# Patient Record
Sex: FEMALE | Race: WHITE
[De-identification: names, ages, dates, MRNs, and addresses within clinical notes are randomized per-mention and may not be internally consistent; named-entity substitution may affect disease eponyms.]

---

## 2019-01-07 PROBLEM — Z00.00 ENCOUNTER FOR PREVENTIVE HEALTH EXAMINATION: Status: ACTIVE | Noted: 2019-01-07

## 2019-01-08 ENCOUNTER — APPOINTMENT (OUTPATIENT)
Dept: DERMATOLOGY | Facility: CLINIC | Age: 84
End: 2019-01-08
Payer: MEDICARE

## 2019-01-08 VITALS — BODY MASS INDEX: 35.68 KG/M2 | WEIGHT: 170 LBS | HEIGHT: 58 IN

## 2019-01-08 DIAGNOSIS — Z91.89 OTHER SPECIFIED PERSONAL RISK FACTORS, NOT ELSEWHERE CLASSIFIED: ICD-10-CM

## 2019-01-08 DIAGNOSIS — Z86.79 PERSONAL HISTORY OF OTHER DISEASES OF THE CIRCULATORY SYSTEM: ICD-10-CM

## 2019-01-08 DIAGNOSIS — H91.93 UNSPECIFIED HEARING LOSS, BILATERAL: ICD-10-CM

## 2019-01-08 PROCEDURE — 99204 OFFICE O/P NEW MOD 45 MIN: CPT

## 2019-01-22 ENCOUNTER — APPOINTMENT (OUTPATIENT)
Dept: DERMATOLOGY | Facility: CLINIC | Age: 84
End: 2019-01-22
Payer: MEDICARE

## 2019-01-22 LAB
ALBUMIN SERPL ELPH-MCNC: 3.6 G/DL
ALP BLD-CCNC: 51 U/L
ALT SERPL-CCNC: 15 U/L
ANION GAP SERPL CALC-SCNC: 10 MMOL/L
AST SERPL-CCNC: 20 U/L
BASOPHILS # BLD AUTO: 0.04 K/UL
BASOPHILS NFR BLD AUTO: 0.3 %
BILIRUB SERPL-MCNC: 0.3 MG/DL
BUN SERPL-MCNC: 27 MG/DL
CALCIUM SERPL-MCNC: 9.1 MG/DL
CHLORIDE SERPL-SCNC: 101 MMOL/L
CO2 SERPL-SCNC: 30 MMOL/L
CREAT SERPL-MCNC: 1.11 MG/DL
EOSINOPHIL # BLD AUTO: 0.61 K/UL
EOSINOPHIL NFR BLD AUTO: 5 %
GLUCOSE SERPL-MCNC: 66 MG/DL
HBV CORE IGG+IGM SER QL: NONREACTIVE
HBV SURFACE AB SER QL: NONREACTIVE
HBV SURFACE AG SER QL: NONREACTIVE
HCT VFR BLD CALC: 40.7 %
HCV AB SER QL: NONREACTIVE
HCV S/CO RATIO: 0.22 S/CO
HGB BLD-MCNC: 12.6 G/DL
IMM GRANULOCYTES NFR BLD AUTO: 0.4 %
LYMPHOCYTES # BLD AUTO: 3.15 K/UL
LYMPHOCYTES NFR BLD AUTO: 25.6 %
M TB IFN-G BLD-IMP: NEGATIVE
MAN DIFF?: NORMAL
MCHC RBC-ENTMCNC: 29.6 PG
MCHC RBC-ENTMCNC: 31 GM/DL
MCV RBC AUTO: 95.5 FL
MONOCYTES # BLD AUTO: 1.47 K/UL
MONOCYTES NFR BLD AUTO: 11.9 %
NEUTROPHILS # BLD AUTO: 6.99 K/UL
NEUTROPHILS NFR BLD AUTO: 56.8 %
PLATELET # BLD AUTO: 305 K/UL
POTASSIUM SERPL-SCNC: 4.1 MMOL/L
PROT SERPL-MCNC: 6.8 G/DL
QUANTIFERON TB PLUS MITOGEN MINUS NIL: 7.1 IU/ML
QUANTIFERON TB PLUS NIL: 0.01 IU/ML
QUANTIFERON TB PLUS TB1 MINUS NIL: 0 IU/ML
QUANTIFERON TB PLUS TB2 MINUS NIL: 0 IU/ML
RBC # BLD: 4.26 M/UL
RBC # FLD: 15 %
SODIUM SERPL-SCNC: 141 MMOL/L
TPMT ENZYME INTERPRETATION: NORMAL
TPMT ENZYME METHODOLOGY: NORMAL
TPMT ENZYME: 17.9
WBC # FLD AUTO: 12.31 K/UL

## 2019-01-22 PROCEDURE — 99214 OFFICE O/P EST MOD 30 MIN: CPT

## 2019-01-22 RX ORDER — PREDNISONE 10 MG/1
10 TABLET ORAL DAILY
Qty: 90 | Refills: 0 | Status: ACTIVE | COMMUNITY
Start: 2019-01-08 | End: 1900-01-01

## 2019-02-06 ENCOUNTER — LABORATORY RESULT (OUTPATIENT)
Age: 84
End: 2019-02-06

## 2019-02-12 ENCOUNTER — APPOINTMENT (OUTPATIENT)
Dept: DERMATOLOGY | Facility: CLINIC | Age: 84
End: 2019-02-12
Payer: MEDICARE

## 2019-02-12 ENCOUNTER — LABORATORY RESULT (OUTPATIENT)
Age: 84
End: 2019-02-12

## 2019-02-12 PROCEDURE — 99214 OFFICE O/P EST MOD 30 MIN: CPT

## 2019-02-12 RX ORDER — DOXAZOSIN 1 MG/1
1 TABLET ORAL
Refills: 0 | Status: ACTIVE | COMMUNITY

## 2019-02-12 RX ORDER — PANTOPRAZOLE 40 MG/1
40 TABLET, DELAYED RELEASE ORAL
Refills: 0 | Status: ACTIVE | COMMUNITY

## 2019-02-12 RX ORDER — TRIAMTERENE AND HYDROCHLOROTHIAZIDE 37.5; 25 MG/1; MG/1
37.5-25 CAPSULE ORAL
Refills: 0 | Status: ACTIVE | COMMUNITY

## 2019-02-12 RX ORDER — ESCITALOPRAM OXALATE 10 MG/1
10 TABLET, FILM COATED ORAL
Refills: 0 | Status: ACTIVE | COMMUNITY

## 2019-02-12 RX ORDER — AMLODIPINE BESYLATE 5 MG/1
5 TABLET ORAL
Refills: 0 | Status: ACTIVE | COMMUNITY

## 2019-02-12 RX ORDER — AZATHIOPRINE 50 1/1
50 TABLET ORAL
Qty: 60 | Refills: 0 | Status: ACTIVE | COMMUNITY
Start: 2019-01-22 | End: 1900-01-01

## 2019-02-12 RX ORDER — METOPROLOL TARTRATE 50 MG/1
50 TABLET ORAL
Refills: 0 | Status: ACTIVE | COMMUNITY

## 2019-02-13 ENCOUNTER — RX CHANGE (OUTPATIENT)
Age: 84
End: 2019-02-13

## 2019-02-13 LAB
ALBUMIN SERPL ELPH-MCNC: 3.4 G/DL
ALP BLD-CCNC: 36 U/L
ALT SERPL-CCNC: 31 U/L
ANION GAP SERPL CALC-SCNC: 13 MMOL/L
AST SERPL-CCNC: 22 U/L
BASOPHILS # BLD AUTO: 0 K/UL
BASOPHILS NFR BLD AUTO: 0 %
BILIRUB SERPL-MCNC: 0.3 MG/DL
BUN SERPL-MCNC: 37 MG/DL
CALCIUM SERPL-MCNC: 9 MG/DL
CHLORIDE SERPL-SCNC: 99 MMOL/L
CO2 SERPL-SCNC: 29 MMOL/L
CREAT SERPL-MCNC: 1.23 MG/DL
DESMOGLEIN 1 AB SER-ACNC: >100 U
DESMOGLEIN 3 AB SER-ACNC: >100 U
EOSINOPHIL # BLD AUTO: 0.2 K/UL
EOSINOPHIL NFR BLD AUTO: 2 %
GLUCOSE SERPL-MCNC: 128 MG/DL
HCT VFR BLD CALC: 38.4 %
HGB BLD-MCNC: 11.9 G/DL
LYMPHOCYTES # BLD AUTO: 1.23 K/UL
LYMPHOCYTES NFR BLD AUTO: 12 %
MAN DIFF?: NORMAL
MCHC RBC-ENTMCNC: 29.7 PG
MCHC RBC-ENTMCNC: 31 GM/DL
MCV RBC AUTO: 95.8 FL
MONOCYTES # BLD AUTO: 0.41 K/UL
MONOCYTES NFR BLD AUTO: 4 %
NEUTROPHILS # BLD AUTO: 7.97 K/UL
NEUTROPHILS NFR BLD AUTO: 78 %
PLATELET # BLD AUTO: 281 K/UL
POTASSIUM SERPL-SCNC: 4.4 MMOL/L
PROT SERPL-MCNC: 5.9 G/DL
RBC # BLD: 4.01 M/UL
RBC # FLD: 15.8 %
SODIUM SERPL-SCNC: 141 MMOL/L
WBC # FLD AUTO: 10.22 K/UL

## 2019-02-14 ENCOUNTER — INPATIENT (INPATIENT)
Facility: HOSPITAL | Age: 84
LOS: 2 days | Discharge: HOME HEALTH | End: 2019-02-17
Attending: HOSPITALIST | Admitting: HOSPITALIST
Payer: MEDICARE

## 2019-02-14 ENCOUNTER — RX CHANGE (OUTPATIENT)
Age: 84
End: 2019-02-14

## 2019-02-14 VITALS — SYSTOLIC BLOOD PRESSURE: 119 MMHG | TEMPERATURE: 98 F | HEART RATE: 80 BPM | DIASTOLIC BLOOD PRESSURE: 40 MMHG

## 2019-02-14 DIAGNOSIS — Z29.9 ENCOUNTER FOR PROPHYLACTIC MEASURES, UNSPECIFIED: ICD-10-CM

## 2019-02-14 DIAGNOSIS — E03.9 HYPOTHYROIDISM, UNSPECIFIED: ICD-10-CM

## 2019-02-14 DIAGNOSIS — I10 ESSENTIAL (PRIMARY) HYPERTENSION: ICD-10-CM

## 2019-02-14 DIAGNOSIS — E78.5 HYPERLIPIDEMIA, UNSPECIFIED: ICD-10-CM

## 2019-02-14 DIAGNOSIS — L10.0 PEMPHIGUS VULGARIS: ICD-10-CM

## 2019-02-14 LAB
ALBUMIN SERPL ELPH-MCNC: 3.1 G/DL — LOW (ref 3.3–5)
ALP SERPL-CCNC: 35 U/L — LOW (ref 40–120)
ALT FLD-CCNC: 27 U/L — SIGNIFICANT CHANGE UP (ref 4–33)
ANION GAP SERPL CALC-SCNC: 11 MMO/L — SIGNIFICANT CHANGE UP (ref 7–14)
AST SERPL-CCNC: 18 U/L — SIGNIFICANT CHANGE UP (ref 4–32)
BASOPHILS # BLD AUTO: 0.02 K/UL — SIGNIFICANT CHANGE UP (ref 0–0.2)
BASOPHILS NFR BLD AUTO: 0.3 % — SIGNIFICANT CHANGE UP (ref 0–2)
BILIRUB SERPL-MCNC: < 0.2 MG/DL — LOW (ref 0.2–1.2)
BUN SERPL-MCNC: 36 MG/DL — HIGH (ref 7–23)
CALCIUM SERPL-MCNC: 9 MG/DL — SIGNIFICANT CHANGE UP (ref 8.4–10.5)
CHLORIDE SERPL-SCNC: 101 MMOL/L — SIGNIFICANT CHANGE UP (ref 98–107)
CO2 SERPL-SCNC: 29 MMOL/L — SIGNIFICANT CHANGE UP (ref 22–31)
CREAT SERPL-MCNC: 1.24 MG/DL — SIGNIFICANT CHANGE UP (ref 0.5–1.3)
EOSINOPHIL # BLD AUTO: 0 K/UL — SIGNIFICANT CHANGE UP (ref 0–0.5)
EOSINOPHIL NFR BLD AUTO: 0 % — SIGNIFICANT CHANGE UP (ref 0–6)
GLUCOSE SERPL-MCNC: 120 MG/DL — HIGH (ref 70–99)
HCT VFR BLD CALC: 36.1 % — SIGNIFICANT CHANGE UP (ref 34.5–45)
HGB BLD-MCNC: 11.4 G/DL — LOW (ref 11.5–15.5)
IMM GRANULOCYTES NFR BLD AUTO: 2.4 % — HIGH (ref 0–1.5)
LYMPHOCYTES # BLD AUTO: 1.11 K/UL — SIGNIFICANT CHANGE UP (ref 1–3.3)
LYMPHOCYTES # BLD AUTO: 18.8 % — SIGNIFICANT CHANGE UP (ref 13–44)
MCHC RBC-ENTMCNC: 30.4 PG — SIGNIFICANT CHANGE UP (ref 27–34)
MCHC RBC-ENTMCNC: 31.6 % — LOW (ref 32–36)
MCV RBC AUTO: 96.3 FL — SIGNIFICANT CHANGE UP (ref 80–100)
MONOCYTES # BLD AUTO: 0.38 K/UL — SIGNIFICANT CHANGE UP (ref 0–0.9)
MONOCYTES NFR BLD AUTO: 6.4 % — SIGNIFICANT CHANGE UP (ref 2–14)
NEUTROPHILS # BLD AUTO: 4.25 K/UL — SIGNIFICANT CHANGE UP (ref 1.8–7.4)
NEUTROPHILS NFR BLD AUTO: 72.1 % — SIGNIFICANT CHANGE UP (ref 43–77)
NRBC # FLD: 0 K/UL — LOW (ref 25–125)
PLATELET # BLD AUTO: 252 K/UL — SIGNIFICANT CHANGE UP (ref 150–400)
PMV BLD: 9.2 FL — SIGNIFICANT CHANGE UP (ref 7–13)
POTASSIUM SERPL-MCNC: 5.4 MMOL/L — HIGH (ref 3.5–5.3)
POTASSIUM SERPL-SCNC: 5.4 MMOL/L — HIGH (ref 3.5–5.3)
PROT SERPL-MCNC: 6.1 G/DL — SIGNIFICANT CHANGE UP (ref 6–8.3)
RBC # BLD: 3.75 M/UL — LOW (ref 3.8–5.2)
RBC # FLD: 15.1 % — HIGH (ref 10.3–14.5)
SODIUM SERPL-SCNC: 141 MMOL/L — SIGNIFICANT CHANGE UP (ref 135–145)
WBC # BLD: 5.9 K/UL — SIGNIFICANT CHANGE UP (ref 3.8–10.5)
WBC # FLD AUTO: 5.9 K/UL — SIGNIFICANT CHANGE UP (ref 3.8–10.5)

## 2019-02-14 PROCEDURE — 99223 1ST HOSP IP/OBS HIGH 75: CPT | Mod: GC

## 2019-02-14 RX ORDER — DIPHENHYDRAMINE HCL 50 MG
50 CAPSULE ORAL EVERY 6 HOURS
Refills: 0 | Status: DISCONTINUED | OUTPATIENT
Start: 2019-02-14 | End: 2019-02-17

## 2019-02-14 RX ORDER — BETAMETHASONE DIPROPIONATE 0.5 MG/G
0.05 OINTMENT TOPICAL
Qty: 1 | Refills: 0 | Status: ACTIVE | OUTPATIENT
Start: 2019-02-12

## 2019-02-14 RX ORDER — ATORVASTATIN CALCIUM 80 MG/1
20 TABLET, FILM COATED ORAL AT BEDTIME
Refills: 0 | Status: DISCONTINUED | OUTPATIENT
Start: 2019-02-14 | End: 2019-02-17

## 2019-02-14 RX ORDER — DIPHENHYDRAMINE HCL 50 MG
50 CAPSULE ORAL ONCE
Refills: 0 | Status: COMPLETED | OUTPATIENT
Start: 2019-02-14 | End: 2019-02-14

## 2019-02-14 RX ORDER — AMLODIPINE BESYLATE 2.5 MG/1
5 TABLET ORAL DAILY
Refills: 0 | Status: DISCONTINUED | OUTPATIENT
Start: 2019-02-14 | End: 2019-02-17

## 2019-02-14 RX ORDER — METOPROLOL TARTRATE 50 MG
50 TABLET ORAL
Refills: 0 | Status: DISCONTINUED | OUTPATIENT
Start: 2019-02-14 | End: 2019-02-17

## 2019-02-14 RX ORDER — ESCITALOPRAM OXALATE 10 MG/1
10 TABLET, FILM COATED ORAL DAILY
Refills: 0 | Status: DISCONTINUED | OUTPATIENT
Start: 2019-02-14 | End: 2019-02-17

## 2019-02-14 RX ORDER — AZATHIOPRINE 100 MG/1
100 TABLET ORAL DAILY
Refills: 0 | Status: DISCONTINUED | OUTPATIENT
Start: 2019-02-14 | End: 2019-02-15

## 2019-02-14 RX ORDER — MORPHINE SULFATE 50 MG/1
4 CAPSULE, EXTENDED RELEASE ORAL ONCE
Refills: 0 | Status: DISCONTINUED | OUTPATIENT
Start: 2019-02-14 | End: 2019-02-14

## 2019-02-14 RX ORDER — MUPIROCIN 20 MG/G
1 OINTMENT TOPICAL
Refills: 0 | Status: DISCONTINUED | OUTPATIENT
Start: 2019-02-14 | End: 2019-02-17

## 2019-02-14 RX ORDER — PANTOPRAZOLE SODIUM 20 MG/1
40 TABLET, DELAYED RELEASE ORAL
Refills: 0 | Status: DISCONTINUED | OUTPATIENT
Start: 2019-02-14 | End: 2019-02-17

## 2019-02-14 RX ORDER — HEPARIN SODIUM 5000 [USP'U]/ML
5000 INJECTION INTRAVENOUS; SUBCUTANEOUS EVERY 8 HOURS
Refills: 0 | Status: DISCONTINUED | OUTPATIENT
Start: 2019-02-14 | End: 2019-02-17

## 2019-02-14 RX ORDER — ASPIRIN/CALCIUM CARB/MAGNESIUM 324 MG
81 TABLET ORAL DAILY
Refills: 0 | Status: DISCONTINUED | OUTPATIENT
Start: 2019-02-14 | End: 2019-02-17

## 2019-02-14 RX ORDER — DOXAZOSIN MESYLATE 4 MG
1 TABLET ORAL DAILY
Refills: 0 | Status: DISCONTINUED | OUTPATIENT
Start: 2019-02-14 | End: 2019-02-14

## 2019-02-14 RX ORDER — LEVOTHYROXINE SODIUM 125 MCG
25 TABLET ORAL DAILY
Refills: 0 | Status: DISCONTINUED | OUTPATIENT
Start: 2019-02-14 | End: 2019-02-17

## 2019-02-14 RX ORDER — OMEGA-3 ACID ETHYL ESTERS 1 G
2 CAPSULE ORAL
Refills: 0 | Status: DISCONTINUED | OUTPATIENT
Start: 2019-02-14 | End: 2019-02-17

## 2019-02-14 RX ORDER — SODIUM CHLORIDE 9 MG/ML
1000 INJECTION, SOLUTION INTRAVENOUS
Refills: 0 | Status: DISCONTINUED | OUTPATIENT
Start: 2019-02-14 | End: 2019-02-17

## 2019-02-14 RX ORDER — ACETAMINOPHEN 500 MG
650 TABLET ORAL EVERY 6 HOURS
Refills: 0 | Status: DISCONTINUED | OUTPATIENT
Start: 2019-02-14 | End: 2019-02-17

## 2019-02-14 RX ORDER — DOXAZOSIN MESYLATE 4 MG
1 TABLET ORAL DAILY
Refills: 0 | Status: DISCONTINUED | OUTPATIENT
Start: 2019-02-14 | End: 2019-02-17

## 2019-02-14 RX ORDER — OXYCODONE AND ACETAMINOPHEN 5; 325 MG/1; MG/1
1 TABLET ORAL EVERY 6 HOURS
Refills: 0 | Status: DISCONTINUED | OUTPATIENT
Start: 2019-02-14 | End: 2019-02-17

## 2019-02-14 RX ADMIN — ATORVASTATIN CALCIUM 20 MILLIGRAM(S): 80 TABLET, FILM COATED ORAL at 21:51

## 2019-02-14 RX ADMIN — MORPHINE SULFATE 4 MILLIGRAM(S): 50 CAPSULE, EXTENDED RELEASE ORAL at 20:17

## 2019-02-14 RX ADMIN — Medication 50 MILLIGRAM(S): at 16:56

## 2019-02-14 RX ADMIN — SODIUM CHLORIDE 75 MILLILITER(S): 9 INJECTION, SOLUTION INTRAVENOUS at 21:51

## 2019-02-14 RX ADMIN — MORPHINE SULFATE 4 MILLIGRAM(S): 50 CAPSULE, EXTENDED RELEASE ORAL at 20:45

## 2019-02-14 NOTE — ED PROVIDER NOTE - MUSCULOSKELETAL, MLM
Strength appropriate for age. Full active range of motion of all 4 extremities. No joint swelling, calor, or deformities. +4 pitting edema of LE b/l.

## 2019-02-14 NOTE — H&P ADULT - NSHPPHYSICALEXAM_GEN_ALL_CORE
T(C): 36.6 (02-14-19 @ 19:32), Max: 36.6 (02-14-19 @ 16:56)  HR: 81 (02-14-19 @ 19:32) (80 - 81)  BP: 114/54 (02-14-19 @ 19:32) (114/54 - 119/40)  RR: 18 (02-14-19 @ 19:32) (18 - 18)  SpO2: 100% (02-14-19 @ 19:32) (100% - 100%)    PHYSICAL EXAM:  GENERAL: NAD, well-groomed, well-developed  HEAD:  Atraumatic, Normocephalic  EYES: EOMI, PERRLA, conjunctiva and sclera clear  ENMT: No tonsillar erythema, exudates, or enlargement; dry mucous membranes, poor dentition, crusted lip lesion along right and lower aspect.   NECK: Supple, No JVD  CHEST/LUNG: Clear to auscultation bilaterally; No rales, rhonchi, wheezing  HEART: Regular rate and rhythm; No murmurs, rubs, or gallops  ABDOMEN: Soft, Nontender, Nondistended; Bowel sounds present  EXTREMITIES:  2+ Peripheral Pulses, b/l non pitting edema of LEs. No clubbing, cyanosis  LYMPH: No lymphadenopathy noted  SKIN: open blisters apprx fist sized along back of neck, left shoulder and elbow with crusted blood, Left breast, upper left abdomen left ankle; ecchymosis of left lower leg.   NERVOUS SYSTEM:  Alert & Oriented X3, Good concentration; No focal deficits.

## 2019-02-14 NOTE — ED ADULT NURSE NOTE - NSIMPLEMENTINTERV_GEN_ALL_ED
Implemented All Universal Safety Interventions:  [place] to call system. Call bell, personal items and telephone within reach. Instruct patient to call for assistance. Room bathroom lighting operational. Non-slip footwear when patient is off stretcher. Physically safe environment: no spills, clutter or unnecessary equipment. Stretcher in lowest position, wheels locked, appropriate side rails in place.

## 2019-02-14 NOTE — ED ADULT NURSE REASSESSMENT NOTE - NS ED NURSE REASSESS COMMENT FT1
Pt AxOx4 verbalizing no improvement in itching after receiving Benadryl. Pt denies pain on lesions at this time. [facility] report given, patient vitally stable and at baseline mental status.

## 2019-02-14 NOTE — ED ADULT NURSE NOTE - OBJECTIVE STATEMENT
83 y female A+OX3 presents to the ER with the complaint of skin sores. As per pt and sons, pt since december has been getting blisters and open sores throughout her skin. Pt has large scabbing sores on the left upper aspect of the chest and the stomach, on her back, arms, small sores on her lips, her feet and her arms with left elbow bandaged up. Both legs look visibly swollen. Pt is able to ambulate without assistance. Was instructed by Dermatologist Dr. Carpenter to come in for admission as pt has been diagnosed with pemphus vulgaris and prednisone has not been alleviating the condition. PMH of borderline diabetes, high cholesterol, and htn. Denies any surgeries in the past. Will await assessment by MD team.

## 2019-02-14 NOTE — ED ADULT TRIAGE NOTE - CHIEF COMPLAINT QUOTE
pt comes to ED with Son for admission for her rare skin disorder Pemphigus Vulgaris. pt has had this for the last month pt has open sores all over her body. pt and family are resufing VS do not want it to spread to other pt.

## 2019-02-14 NOTE — H&P ADULT - HISTORY OF PRESENT ILLNESS
GS is a 84 yo w/ PMH of GERD, HTN that is here for inpatient management of pemphigus vulgaris, which started 2 mo ago. Seen with sons Robinson and Alfonso at bedside. The skin lesions began in December 2 mo ago on the ankle and around the mouth and was found to have throat lesions by ENT. Does not report any known triggers or starting any new meds prior to lesions beginning (though took several courses of abx for sinusitis after mouth lesions started). In Dec, saw an outside dermatologist who started her on 10mg qd prednisone. Since 1/8 has been following with Dr. Yariel Carpenter, DIF showed C3 and IgG deposition confirming dx. On 1/8 was increased to prednisone to 30mg qd, on 2/12 increased prednisone to 50mg qd. On 1/22 started on azathioprine 100mg qd. Since starting treatment, family reports no new lesions have appeared, but existing lesions do not seem to be improving. She has been using betamethasone 0.05% cream for lesions on her lips, but otherwise only using petroleum jelly (no corticosteroids) on the rest of her lesions. She has pain and itching from the lesions impacting her ability to sleep and ambulate, and blood oozing after showering. After discussion with Dr. Carpenter about lack of improvement, family decided to come to hospital with plan to start rituximab. Reports throat pain with consequent decreased PO intake. Denies any recent illness, fevers, chills, previous rashes, autoimmune diseases. Denies any lesions or pain around vulva,vagina, anus. Reports baseline itchiness in eyes, but no changes in vision. Endorses new leg swelling, but no dyspnea or chest pain. Endorses deconditioning and decreased balance.    In ED given benadryl with improvement in itching.    FYI: In [employer] pt is under name Yary Denise Of note: In [employer] pt is under name Yary Denise MRN - [id number]    GS is a 82 yo w/ Pemphigus Vulgaris, GERD, HTN, Hypothyroidism c/o painful blisters due to pemphigus vulgaris. Seen with sons Robinson and Alfonso at bedside. The skin lesions began in December on the ankle and around the mouth and was found to have throat lesions by ENT. Does not report any known triggers or starting any new meds prior to onset of lesions (although took several courses of abx for sinusitis after mouth lesions started per ENT). In Dec, saw a dermatologist and had skin bx with DIF which showed C3 and IgG deposition, c/w Pemphigus vulgaris. Started 10mg qd prednisone. 2 weeks later, Saw Dr Yariel Carpenter, ([facility]) and was prednisone increased to 30mg qd. Two weeks later, showed no improvement and continued pred and started azathioprine 100mg qd. Followed up on tuesday 2/12, pt with no improvement in blisters and prednisone was increased to 50mg qd.  Pt saw PCP yesterday who advised coming to the hospital due to concern for worsening pain, lack of improvement, decreased PO intake and inability to walk. Since starting treatment, family reports no new lesions have appeared, but existing lesions do not seem to be improving. She has been using betamethasone 0.05% cream for lesions on her lips, but otherwise only using petroleum jelly (no corticosteroids) on the rest of her lesions. She has pain and itching from the lesions impacting her ability to sleep and ambulate, and blood oozing from blisters after showering. Also Reports throat pain with consequent decreased PO intake. Denies any recent illness, fevers, chills, previous rashes, autoimmune diseases. Denies any lesions or pain around vulva,vagina, anus. Reports baseline itchiness in eyes, but no changes in vision. Endorses new leg swelling, but no dyspnea or chest pain. Endorses deconditioning and decreased balance.    In ED given benadryl with improvement in itching.

## 2019-02-14 NOTE — H&P ADULT - PMH
Essential hypertension    Gastroesophageal reflux disease without esophagitis    Hyperlipidemia, unspecified hyperlipidemia type    Hypothyroidism, unspecified type    Pemphigus vulgaris

## 2019-02-14 NOTE — H&P ADULT - PROBLEM SELECTOR PLAN 1
Ongoing for 2 months with no improvement in lesions. Currently on Pred 50mg qd and AZA 100mg qd.   - Discussed with Derm, advised to cont pred and AZA, plan to start rituximab tomorrow  - Wound care - xeroform to opens with white gauze, mupirocin if tolerated, wound care consult  - pain management - percocet 5/325 q6 PRN for mod-sev pain, tylenol 650mg for mild pain  - soft diet due to pain  - magic mouthwash for oral care  - IVF LR x 12 hours due to decreased po intake and dehydration 2/2 wounds Ongoing for 2 months with no improvement in lesions. Currently on Pred 50mg qd and AZA 100mg qd.   - Discussed with Derm, advised to cont pred and AZA, plan to start rituximab tomorrow  - Wound care - xeroform to opens with white gauze, mupirocin if tolerated, wound care consult  - pain management - percocet 5/325 q6 PRN for mod-sev pain, tylenol 650mg for mild pain  - soft diet due to pain  - magic mouthwash for oral care  - IVF LR x 12 hours due to decreased po intake and dehydration 2/2 wounds  - will monitor serum glucose on AM BMP, low threshold to start SSI if BG worsening Ongoing for 2 months with no improvement in lesions. Currently on Pred 50mg qd and AZA 100mg qd   - Discussed with Derm, advised to cont pred and AZA, plan to start rituximab tomorrow  - took AZA dose today - Will call heme/onc in AM for approval to order med as per hospital policy  - Wound care - xeroform gauze for open blisters, mupirocin if tolerated, wound care consult  - pain management - percocet 5/325 q6 PRN for mod-sev pain, tylenol 650mg for mild pain  - soft diet due to pain  - magic mouthwash for oral care  - IVF LR x 12 hours due to decreased po intake and dehydration 2/2 wounds  - will monitor serum glucose on AM BMP, low threshold to start SSI if BG worsening Ongoing for 2 months with no improvement in lesions. Currently on Pred 50mg qd and AZA 100mg qd   - Discussed with Derm, advised to cont prednisone and AZA, plan to start rituximab tomorrow. Quant Gold, HBV, and HCV negative on o/p labs.   - took AZA dose today - Will call heme/onc in AM for approval to order med as per hospital policy, (Had normal TMPT enzyme on o/p labs)  - Wound care - xeroform gauze for open blisters, mupirocin if tolerated, wound care consult  - pain management - percocet 5/325 q6 PRN for mod-sev pain, tylenol 650mg for mild pain  - soft diet due to pain  - magic mouthwash for oral care  - IVF LR x 12 hours due to decreased po intake and dehydration 2/2 wounds  - will monitor serum glucose on AM BMP, low threshold to start SSI if BG worsening

## 2019-02-14 NOTE — H&P ADULT - NSHPREVIEWOFSYSTEMS_GEN_ALL_CORE
REVIEW OF SYSTEMS:  CONSTITUTIONAL: + weakness, No fevers or chills  EYES/ENT: No visual changes;  No vertigo +throat pain   NECK: No pain or stiffness  RESPIRATORY: +chronic cough, No wheezing, hemoptysis; No shortness of breath  CARDIOVASCULAR: No chest pain or palpitations  GASTROINTESTINAL: No abdominal or epigastric pain. No nausea, vomiting, or hematemesis; No diarrhea or constipation. No melena or hematochezia.  GENITOURINARY: No dysuria, frequency or hematuria  NEUROLOGICAL: No numbness, +weakness  SKIN: +itching, +rashes as per HPI. No burning  All other review of systems is negative unless indicated above.

## 2019-02-14 NOTE — ED PROVIDER NOTE - ATTENDING CONTRIBUTION TO CARE
Dr. Norton: I have personally performed a face to face bedside history and physical examination of this patient. I have discussed the history, examination, review of systems, assessment and plan of management with the resident. I have reviewed the electronic medical record and amended it to reflect my history, review of systems, physical exam, assessment and plan.      83F here Dr. Norton: I have personally performed a face to face bedside history and physical examination of this patient. I have discussed the history, examination, review of systems, assessment and plan of management with the resident. I have reviewed the electronic medical record and amended it to reflect my history, review of systems, physical exam, assessment and plan.      83F here for admission for treatment of pemphigus vulgaris. Pt with lesions to extremities and back since  December that have been getting worse despite treatment with prednisone and atathriopine. Pt reports lesions are pruritic. Denies pain, purulence ,f/c.    on exam afebrile  large desquamated lesions to L shoulder, back, legs. Nonetder. No crepitus or purulence.    pt with pemphigus vulgaris, no signs of infection. do not suspect TEN, SJS. Plan for admission for further treatment.

## 2019-02-14 NOTE — H&P ADULT - ASSESSMENT
84 yo w/ GERD, HTN, Hypothyroidism, Pemphigus vulgaris, presents with painful blisters 2/2 PV c/b decreased PO intake and deconditioning.

## 2019-02-14 NOTE — ED PROVIDER NOTE - CLINICAL SUMMARY MEDICAL DECISION MAKING FREE TEXT BOX
Nishant Lemus (Resident): 84 y/o female recent dx of Pemphigus presents for admission for worsening blisters and to pursue further treatment - will obtain screening labs and vitals and d/w hospitalist.

## 2019-02-14 NOTE — ED ADULT NURSE REASSESSMENT NOTE - NS ED NURSE REASSESS COMMENT FT1
Received report from MAXIMO Gonzalez. Pt AxOx4, verbalizing symptoms have remained unchanged since arriving to ED. Pt c/o itching throughout the body, draining serosanguinous fluid. Pt has several open scabbing sores and wounds present throughout the body secondary to skin condition and itching. Pt denies any other symptoms, denies aches, CP, SOB, N/V, diarrhea and does not appear to be in distress. MD at bedside, will continue to monitor.

## 2019-02-14 NOTE — ED PROVIDER NOTE - OBJECTIVE STATEMENT
82 y/o female hx of Pre DM, HTN, HLD, recent dx of Pemphigus Vulgaris few months ago, presents for worsening rash, pain, and to persue further treatment. Patient [place] speaking w/ family translating. States started on Prednisone 30 mg after dx by Derm (Dr. Carpenter). Worsening of the lesions, pain, so increased to 50mg yesterday. Also taking Azathioprine 100mg daily. Told to come into ED for further management b/c of steroids and concern for hyperglycemia and possibly start Rituxan. Patient main complaint now is itching and pain, a well as insomnia. No f/c, N/V/D, abd pain. No recent travel.  PMD: Lorenzo Howard  Derm: Yariel Carpenter

## 2019-02-14 NOTE — H&P ADULT - NSHPLABSRESULTS_GEN_ALL_CORE
The labs were reviewed by me. Imaging was reviewed by me. EKG was reviewed by me.                        11.4   5.90  )-----------( 252      ( 14 Feb 2019 16:48 )             36.1     02-14  141  |  101  |  36<H>  ----------------------------<  120<H>  5.4<H>   |  29  |  1.24    Ca    9.0      14 Feb 2019 16:48    TPro  6.1  /  Alb  3.1<L>  /  TBili  < 0.2<L>  /  DBili  x   /  AST  18  /  ALT  27  /  AlkPhos  35<L>  02-14          POCT Blood Glucose.: 95 mg/dL (14 Feb 2019 13:48)    RADIOLOGY: None

## 2019-02-14 NOTE — H&P ADULT - PROBLEM SELECTOR PLAN 2
currently normotensive.  - cont lopressor 50mg BID, amlodipine 5mg PO qd, doxazosin 1mg  - will hold triamterene-HCTZ to prevent dehydration in setting of open wounds, will restart if necessary

## 2019-02-14 NOTE — ED PROVIDER NOTE - PROGRESS NOTE DETAILS
Nishant Lemus (Resident): Spoke with Derm - no need for any changes in treatment until day team sees her in the morning - just steroids and aza for now (patient's current meds)

## 2019-02-14 NOTE — H&P ADULT - NSHPSOCIALHISTORY_GEN_ALL_CORE
No tobacco, ETOH use, drug use. Lives with . Housewife. Has 3 children in [place]. From [place]. Does not use walker or cane.

## 2019-02-14 NOTE — ED PROVIDER NOTE - SKIN, MLM
Scattered blisters in various stages of healing w/ some crusting, mild clear draiange. Worst of the lesions are on the back, w/ exposed erythematous base. Small lesion on upper lip, and early blisters on the arms b/l.

## 2019-02-14 NOTE — ED PROVIDER NOTE - CONSTITUTIONAL, MLM
normal... Well appearing, well nourished, awake, alert, oriented to person, place, time/situation and in mild discomort.

## 2019-02-15 ENCOUNTER — RX CHANGE (OUTPATIENT)
Age: 84
End: 2019-02-15

## 2019-02-15 ENCOUNTER — TRANSCRIPTION ENCOUNTER (OUTPATIENT)
Age: 84
End: 2019-02-15

## 2019-02-15 DIAGNOSIS — N17.9 ACUTE KIDNEY FAILURE, UNSPECIFIED: ICD-10-CM

## 2019-02-15 LAB
ANION GAP SERPL CALC-SCNC: 10 MMO/L — SIGNIFICANT CHANGE UP (ref 7–14)
BUN SERPL-MCNC: 30 MG/DL — HIGH (ref 7–23)
CALCIUM SERPL-MCNC: 8.7 MG/DL — SIGNIFICANT CHANGE UP (ref 8.4–10.5)
CHLORIDE SERPL-SCNC: 104 MMOL/L — SIGNIFICANT CHANGE UP (ref 98–107)
CHOLEST SERPL-MCNC: 122 MG/DL — SIGNIFICANT CHANGE UP (ref 120–199)
CO2 SERPL-SCNC: 29 MMOL/L — SIGNIFICANT CHANGE UP (ref 22–31)
CREAT SERPL-MCNC: 1.12 MG/DL — SIGNIFICANT CHANGE UP (ref 0.5–1.3)
GLUCOSE SERPL-MCNC: 89 MG/DL — SIGNIFICANT CHANGE UP (ref 70–99)
HBA1C BLD-MCNC: 6 % — HIGH (ref 4–5.6)
HCT VFR BLD CALC: 34.1 % — LOW (ref 34.5–45)
HDLC SERPL-MCNC: 53 MG/DL — SIGNIFICANT CHANGE UP (ref 45–65)
HGB BLD-MCNC: 10.8 G/DL — LOW (ref 11.5–15.5)
LIPID PNL WITH DIRECT LDL SERPL: 58 MG/DL — SIGNIFICANT CHANGE UP
MAGNESIUM SERPL-MCNC: 2.1 MG/DL — SIGNIFICANT CHANGE UP (ref 1.6–2.6)
MCHC RBC-ENTMCNC: 30.4 PG — SIGNIFICANT CHANGE UP (ref 27–34)
MCHC RBC-ENTMCNC: 31.7 % — LOW (ref 32–36)
MCV RBC AUTO: 96.1 FL — SIGNIFICANT CHANGE UP (ref 80–100)
NRBC # FLD: 0 K/UL — LOW (ref 25–125)
PHOSPHATE SERPL-MCNC: 3.3 MG/DL — SIGNIFICANT CHANGE UP (ref 2.5–4.5)
PLATELET # BLD AUTO: 244 K/UL — SIGNIFICANT CHANGE UP (ref 150–400)
PMV BLD: 9.2 FL — SIGNIFICANT CHANGE UP (ref 7–13)
POTASSIUM SERPL-MCNC: 4.2 MMOL/L — SIGNIFICANT CHANGE UP (ref 3.5–5.3)
POTASSIUM SERPL-SCNC: 4.2 MMOL/L — SIGNIFICANT CHANGE UP (ref 3.5–5.3)
RBC # BLD: 3.55 M/UL — LOW (ref 3.8–5.2)
RBC # FLD: 15.4 % — HIGH (ref 10.3–14.5)
SODIUM SERPL-SCNC: 143 MMOL/L — SIGNIFICANT CHANGE UP (ref 135–145)
T3FREE SERPL-MCNC: 1.79 PG/ML — LOW (ref 1.8–4.6)
T4 FREE SERPL-MCNC: 1.34 NG/DL — SIGNIFICANT CHANGE UP (ref 0.9–1.8)
TRIGL SERPL-MCNC: 88 MG/DL — SIGNIFICANT CHANGE UP (ref 10–149)
TSH SERPL-MCNC: 1.93 UIU/ML — SIGNIFICANT CHANGE UP (ref 0.27–4.2)
WBC # BLD: 6.69 K/UL — SIGNIFICANT CHANGE UP (ref 3.8–10.5)
WBC # FLD AUTO: 6.69 K/UL — SIGNIFICANT CHANGE UP (ref 3.8–10.5)

## 2019-02-15 PROCEDURE — 99223 1ST HOSP IP/OBS HIGH 75: CPT | Mod: GC

## 2019-02-15 PROCEDURE — 99233 SBSQ HOSP IP/OBS HIGH 50: CPT | Mod: GC

## 2019-02-15 RX ORDER — POLYETHYLENE GLYCOL 3350 17 G/17G
17 POWDER, FOR SOLUTION ORAL
Qty: 0 | Refills: 0 | DISCHARGE
Start: 2019-02-15

## 2019-02-15 RX ORDER — RITUXIMAB 10 MG/ML
1000 INJECTION, SOLUTION INTRAVENOUS ONCE
Refills: 0 | Status: DISCONTINUED | OUTPATIENT
Start: 2019-02-15 | End: 2019-02-16

## 2019-02-15 RX ORDER — SENNA PLUS 8.6 MG/1
2 TABLET ORAL
Qty: 0 | Refills: 0 | DISCHARGE
Start: 2019-02-15

## 2019-02-15 RX ORDER — DIPHENHYDRAMINE HCL 50 MG
25 CAPSULE ORAL ONCE
Refills: 0 | Status: DISCONTINUED | OUTPATIENT
Start: 2019-02-15 | End: 2019-02-16

## 2019-02-15 RX ORDER — ACETAMINOPHEN 500 MG
500 TABLET ORAL ONCE
Refills: 0 | Status: DISCONTINUED | OUTPATIENT
Start: 2019-02-15 | End: 2019-02-16

## 2019-02-15 RX ORDER — POLYETHYLENE GLYCOL 3350 17 G/17G
17 POWDER, FOR SOLUTION ORAL DAILY
Refills: 0 | Status: DISCONTINUED | OUTPATIENT
Start: 2019-02-15 | End: 2019-02-17

## 2019-02-15 RX ORDER — SENNA PLUS 8.6 MG/1
2 TABLET ORAL AT BEDTIME
Refills: 0 | Status: DISCONTINUED | OUTPATIENT
Start: 2019-02-15 | End: 2019-02-17

## 2019-02-15 RX ADMIN — HEPARIN SODIUM 5000 UNIT(S): 5000 INJECTION INTRAVENOUS; SUBCUTANEOUS at 07:37

## 2019-02-15 RX ADMIN — Medication 50 MILLIGRAM(S): at 18:37

## 2019-02-15 RX ADMIN — POLYETHYLENE GLYCOL 3350 17 GRAM(S): 17 POWDER, FOR SOLUTION ORAL at 13:12

## 2019-02-15 RX ADMIN — ATORVASTATIN CALCIUM 20 MILLIGRAM(S): 80 TABLET, FILM COATED ORAL at 22:02

## 2019-02-15 RX ADMIN — AMLODIPINE BESYLATE 5 MILLIGRAM(S): 2.5 TABLET ORAL at 07:37

## 2019-02-15 RX ADMIN — Medication 1 TABLET(S): at 13:12

## 2019-02-15 RX ADMIN — ESCITALOPRAM OXALATE 10 MILLIGRAM(S): 10 TABLET, FILM COATED ORAL at 13:12

## 2019-02-15 RX ADMIN — Medication 50 MILLIGRAM(S): at 07:37

## 2019-02-15 RX ADMIN — Medication 1 MILLIGRAM(S): at 07:37

## 2019-02-15 RX ADMIN — Medication 25 MICROGRAM(S): at 07:37

## 2019-02-15 RX ADMIN — Medication 50 MILLIGRAM(S): at 07:36

## 2019-02-15 RX ADMIN — Medication 81 MILLIGRAM(S): at 13:12

## 2019-02-15 RX ADMIN — HEPARIN SODIUM 5000 UNIT(S): 5000 INJECTION INTRAVENOUS; SUBCUTANEOUS at 22:02

## 2019-02-15 RX ADMIN — PANTOPRAZOLE SODIUM 40 MILLIGRAM(S): 20 TABLET, DELAYED RELEASE ORAL at 07:36

## 2019-02-15 RX ADMIN — MUPIROCIN 1 APPLICATION(S): 20 OINTMENT TOPICAL at 19:30

## 2019-02-15 RX ADMIN — SENNA PLUS 2 TABLET(S): 8.6 TABLET ORAL at 22:02

## 2019-02-15 RX ADMIN — Medication 2 GRAM(S): at 18:37

## 2019-02-15 RX ADMIN — Medication 2 GRAM(S): at 07:36

## 2019-02-15 RX ADMIN — Medication 1 DROP(S): at 18:37

## 2019-02-15 NOTE — PHYSICAL THERAPY INITIAL EVALUATION ADULT - PERTINENT HX OF CURRENT PROBLEM, REHAB EVAL
This is an 84 yo w/ GERD, HTN, Hypothyroidism, Pemphigus vulgaris, presents with painful blisters 2/2 PV c/b decreased PO intake and deconditioning.

## 2019-02-15 NOTE — PROGRESS NOTE ADULT - SUBJECTIVE AND OBJECTIVE BOX
Authored by Dr Kings Doherty [phone] [facility] / [id number] [address]    Patient is a 83y old  Female who presents with a chief complaint of Pemphigus vulgaris (14 Feb 2019 20:43)    SUBJECTIVE / OVERNIGHT EVENTS:    Ms. Escobar is a 84 yo w/ Pemphigus Vulgaris, GERD, HTN, Hypothyroidism c/o painful blisters due to pemphigus vulgaris.  The skin lesions began in December on the ankle and around the mouth and was found to have throat lesions by ENT. Does not report any known triggers or starting any new meds prior to onset of lesions (although took several courses of abx for sinusitis after mouth lesions started per ENT). In Dec, saw a dermatologist and had skin bx with DIF which showed C3 and IgG deposition, c/w Pemphigus vulgaris. Started 10mg qd prednisone. 2 weeks later, Saw Dr Yariel Carpenter, ([facility]) and was prednisone increased to 30mg qd. Two weeks later, showed no improvement and continued pred and started azathioprine 100mg qd. Followed up on Tuesday 2/12, pt with no improvement in blisters and prednisone was increased to 50mg qd.  Pt saw PCP yesterday who advised coming to the hospital due to concern for worsening pain, lack of improvement, decreased PO intake and inability to walk. Since starting treatment, family reports no new lesions have appeared, but existing lesions do not seem to be improving. She has been using betamethasone 0.05% cream for lesions on her lips, but otherwise only using petroleum jelly (no corticosteroids) on the rest of her lesions. She has pain and itching from the lesions impacting her ability to sleep and ambulate, and blood oozing from blisters after showering. Also Reports throat pain with consequent decreased PO intake. Denies any recent illness, fevers, chills, previous rashes, autoimmune diseases. Denies any lesions or pain around vulva, vagina, or anus. Reports baseline itchiness in eyes, but no changes in vision. Endorses new leg swelling, but no dyspnea or chest pain. Endorses deconditioning and decreased balance.    In ED given benadryl with improvement in itching.    MEDICATIONS  (STANDING):  amLODIPine   Tablet 5 milliGRAM(s) Oral daily  artificial tears (preservative free) Ophthalmic Solution 1 Drop(s) Both EYES two times a day  aspirin enteric coated 81 milliGRAM(s) Oral daily  atorvastatin 20 milliGRAM(s) Oral at bedtime  azaTHIOprine 100 milliGRAM(s) Oral daily  calcium carbonate 1250 mG  + Vitamin D (OsCal 500 + D) 1 Tablet(s) Oral daily  doxazosin 1 milliGRAM(s) Oral daily  escitalopram 10 milliGRAM(s) Oral daily  heparin  Injectable 5000 Unit(s) SubCutaneous every 8 hours  lactated ringers. 1000 milliLiter(s) (75 mL/Hr) IV Continuous <Continuous>  levothyroxine 25 MICROGram(s) Oral daily  metoprolol tartrate 50 milliGRAM(s) Oral two times a day  mupirocin 2% Ointment 1 Application(s) Topical two times a day  omega-3-Acid Ethyl Esters 2 Gram(s) Oral two times a day  pantoprazole    Tablet 40 milliGRAM(s) Oral before breakfast  polyethylene glycol 3350 17 Gram(s) Oral daily  predniSONE   Tablet 50 milliGRAM(s) Oral daily  senna 2 Tablet(s) Oral at bedtime    MEDICATIONS  (PRN):  acetaminophen    Suspension .. 650 milliGRAM(s) Oral every 6 hours PRN Mild Pain (1 - 3)  diphenhydrAMINE 50 milliGRAM(s) Oral every 6 hours PRN Rash and/or Itching  oxyCODONE    5 mG/acetaminophen 325 mG 1 Tablet(s) Oral every 6 hours PRN moderate to severe pain      Vital Signs Last 24 Hrs  T(C): 36.7 (15 Feb 2019 07:35), Max: 36.7 (15 Feb 2019 07:35)  T(F): 98 (15 Feb 2019 07:35), Max: 98 (15 Feb 2019 07:35)  HR: 100 (15 Feb 2019 07:35) (76 - 100)  BP: 152/63 (15 Feb 2019 07:35) (114/54 - 152/63)  BP(mean): --  RR: 18 (15 Feb 2019 07:35) (17 - 18)  SpO2: 97% (15 Feb 2019 07:35) (95% - 100%)  CAPILLARY BLOOD GLUCOSE      POCT Blood Glucose.: 95 mg/dL (14 Feb 2019 13:48)    I&O's Summary      PHYSICAL EXAM  GENERAL: NAD, well-groomed, well-developed  HEAD:  Atraumatic, Normocephalic  EYES: EOMI, PERRLA, conjunctiva and sclera clear  ENMT: No tonsillar erythema, exudates, or enlargement; dry mucous membranes, poor dentition, crusted lip lesion along right and lower aspect.   NECK: Supple, No JVD  CHEST/LUNG: Clear to auscultation bilaterally; No rales, rhonchi, wheezing  HEART: Regular rate and rhythm; No murmurs, rubs, or gallops  ABDOMEN: Soft, Nontender, Nondistended; Bowel sounds present  EXTREMITIES:  2+ Peripheral Pulses, b/l non pitting edema of LEs. No clubbing, cyanosis  LYMPH: No lymphadenopathy noted  SKIN: open blisters apprx fist sized along back of neck, left shoulder and elbow with crusted blood, Left breast, upper left abdomen left ankle; ecchymosis of left lower leg.   NERVOUS SYSTEM:  Alert & Oriented X3, Good concentration; No focal deficits.    LABS:                        10.8   6.69  )-----------( 244      ( 15 Feb 2019 04:54 )             34.1     02-15    143  |  104  |  30<H>  ----------------------------<  89  4.2   |  29  |  1.12    Ca    8.7      15 Feb 2019 04:54  Phos  3.3     02-15  Mg     2.1     02-15    TPro  6.1  /  Alb  3.1<L>  /  TBili  < 0.2<L>  /  DBili  x   /  AST  18  /  ALT  27  /  AlkPhos  35<L>  02-14                RADIOLOGY & ADDITIONAL TESTS:    Imaging Personally Reviewed:  Consultant(s) Notes Reviewed:  Derm  Care Discussed with Consultants/Other Providers: Derm Authored by Dr Kings Doherty [phone] [facility] / [id number] [address]    Patient is a 83y old  Female who presents with a chief complaint of Pemphigus vulgaris (14 Feb 2019 20:43)    SUBJECTIVE / OVERNIGHT EVENTS:    Ms. Escobar is a 84 yo w/ Pemphigus Vulgaris, GERD, HTN, Hypothyroidism c/o painful blisters due to pemphigus vulgaris.  The skin lesions began in December on the ankle and around the mouth and was found to have throat lesions by ENT. Does not report any known triggers or starting any new meds prior to onset of lesions (although took several courses of abx for sinusitis after mouth lesions started per ENT). In Dec, saw a dermatologist and had skin bx with DIF which showed C3 and IgG deposition, c/w Pemphigus vulgaris. Started 10mg qd prednisone. 2 weeks later, Saw Dr Yariel Carpenter, ([facility]) and was prednisone increased to 30mg qd. Two weeks later, showed no improvement and continued pred and started azathioprine 100mg qd. Followed up on Tuesday 2/12, pt with no improvement in blisters and prednisone was increased to 50mg qd.  Pt saw PCP yesterday who advised coming to the hospital due to concern for worsening pain, lack of improvement, decreased PO intake and inability to walk. Since starting treatment, family reports no new lesions have appeared, but existing lesions do not seem to be improving. She has been using betamethasone 0.05% cream for lesions on her lips, but otherwise only using petroleum jelly (no corticosteroids) on the rest of her lesions. She has pain and itching from the lesions impacting her ability to sleep and ambulate, and blood oozing from blisters after showering. Also Reports throat pain with consequent decreased PO intake. Denies any recent illness, fevers, chills, previous rashes, autoimmune diseases. Denies any lesions or pain around vulva, vagina, or anus. Reports baseline itchiness in eyes, but no changes in vision. Endorses new leg swelling, but no dyspnea or chest pain. Endorses deconditioning and decreased balance.    In ED given benadryl with improvement in itching.    MEDICATIONS  (STANDING):  amLODIPine   Tablet 5 milliGRAM(s) Oral daily  artificial tears (preservative free) Ophthalmic Solution 1 Drop(s) Both EYES two times a day  aspirin enteric coated 81 milliGRAM(s) Oral daily  atorvastatin 20 milliGRAM(s) Oral at bedtime  azaTHIOprine 100 milliGRAM(s) Oral daily  calcium carbonate 1250 mG  + Vitamin D (OsCal 500 + D) 1 Tablet(s) Oral daily  doxazosin 1 milliGRAM(s) Oral daily  escitalopram 10 milliGRAM(s) Oral daily  heparin  Injectable 5000 Unit(s) SubCutaneous every 8 hours  lactated ringers. 1000 milliLiter(s) (75 mL/Hr) IV Continuous <Continuous>  levothyroxine 25 MICROGram(s) Oral daily  metoprolol tartrate 50 milliGRAM(s) Oral two times a day  mupirocin 2% Ointment 1 Application(s) Topical two times a day  omega-3-Acid Ethyl Esters 2 Gram(s) Oral two times a day  pantoprazole    Tablet 40 milliGRAM(s) Oral before breakfast  polyethylene glycol 3350 17 Gram(s) Oral daily  predniSONE   Tablet 50 milliGRAM(s) Oral daily  senna 2 Tablet(s) Oral at bedtime    MEDICATIONS  (PRN):  acetaminophen    Suspension .. 650 milliGRAM(s) Oral every 6 hours PRN Mild Pain (1 - 3)  diphenhydrAMINE 50 milliGRAM(s) Oral every 6 hours PRN Rash and/or Itching  oxyCODONE    5 mG/acetaminophen 325 mG 1 Tablet(s) Oral every 6 hours PRN moderate to severe pain      Vital Signs Last 24 Hrs  T(C): 36.7 (15 Feb 2019 07:35), Max: 36.7 (15 Feb 2019 07:35)  T(F): 98 (15 Feb 2019 07:35), Max: 98 (15 Feb 2019 07:35)  HR: 100 (15 Feb 2019 07:35) (76 - 100)  BP: 152/63 (15 Feb 2019 07:35) (114/54 - 152/63)  BP(mean): --  RR: 18 (15 Feb 2019 07:35) (17 - 18)  SpO2: 97% (15 Feb 2019 07:35) (95% - 100%)  CAPILLARY BLOOD GLUCOSE      POCT Blood Glucose.: 95 mg/dL (14 Feb 2019 13:48)    I&O's Summary      PHYSICAL EXAM  GENERAL: NAD, resting comfortably in bed	  HEAD:  Atraumatic, Normocephalic  EYES: EOMI, PERRLA, conjunctiva and sclera clear  ENMT: No tonsillar erythema, exudates, or enlargement; dry mucous membranes, poor dentition, crusted lip lesion along right and lower aspect.   NECK: Supple, No JVD  CHEST/LUNG: Clear to auscultation bilaterally; No rales, rhonchi, wheezing  HEART: Regular rate and rhythm; No murmurs, rubs, or gallops  ABDOMEN: Soft, Nontender, Nondistended; Bowel sounds present  EXTREMITIES:  2+ Peripheral Pulses, b/l non pitting edema of LEs. No clubbing, cyanosis  LYMPH: No lymphadenopathy noted  SKIN: open blisters apprx fist sized along back of neck, left shoulder and elbow with crusted blood, Left breast, upper left abdomen left ankle; ecchymosis of left lower leg.   NERVOUS SYSTEM:  Alert & Oriented X3, Good concentration; No focal deficits.    LABS:                        10.8   6.69  )-----------( 244      ( 15 Feb 2019 04:54 )             34.1     02-15    143  |  104  |  30<H>  ----------------------------<  89  4.2   |  29  |  1.12    Ca    8.7      15 Feb 2019 04:54  Phos  3.3     02-15  Mg     2.1     02-15    TPro  6.1  /  Alb  3.1<L>  /  TBili  < 0.2<L>  /  DBili  x   /  AST  18  /  ALT  27  /  AlkPhos  35<L>  02-14                RADIOLOGY & ADDITIONAL TESTS:    Imaging Personally Reviewed:  Consultant(s) Notes Reviewed:  Derm  Care Discussed with Consultants/Other Providers: Derm

## 2019-02-15 NOTE — CONSULT NOTE ADULT - ASSESSMENT
82 yo F with pemphigus vulgaris, currently flaring, admitted for medical optimization and treatment with IVIG     1. Pemphigus vulgaris  -Plan for first dose of Rituximab 1000mg IVPB tomorrow (paper order completed, consent form signed)  -Pre-treatment with Tylenol 650mg once and benadryl PO 25mg once prior to infusion  -C/w mupirocin 2% ointment to open areas, covered with Xeroform, wrapped with gauze   -C/w prednisone 50mg daily until Rituximab infusion, after infusion drop down to 40mg daily for discharge  -From dermatologic standpoint, patient OK to go home after Rituximab infusion  -Patient should go home with management plan for sugars while on prednisone.  Does not have glucometer at home.  -Patient should follow up with Dr. Carpenter on 2/18 or 2/20.  Dermatology scheduling team will consult patient for appointment  -Will need repeat infusion of Rituximab 1000mg in 2 weeks, our office will arrange for authorization and administration of medication at infusion center     Aura Hu MD (PGY-3)   Dermatology Resident  [facility]  Pager: [phone]  Office [phone]

## 2019-02-15 NOTE — DISCHARGE NOTE ADULT - PATIENT PORTAL LINK FT
You can access the [facility] Patient Portal, offered by [facility], by registering with the following website: [email or web address]

## 2019-02-15 NOTE — DISCHARGE NOTE ADULT - HOSPITAL COURSE
Ms. Denise is a 84 yo female with history of GERD, HTN, Hypothyroidism, and Pemphigus vulgaris (on imuran and prednisone), who presents with painful blisters due to PV complicated by decreased PO intake and deconditioning. Dermatology was consulted and patient received rituximab infusion while in the hospital Ms. Denise is a 82 yo female with history of GERD, HTN, Hypothyroidism, and Pemphigus vulgaris (on imuran and prednisone), who presents with painful blisters due to PV complicated by decreased PO intake and deconditioning. Dermatology was consulted and patient received rituximab infusion while in the hospital. Imuran was discontinued and prednisone dose reduced to 40mg daily. Ms. Denise is a 84 yo female with history of GERD, HTN, Hypothyroidism, and Pemphigus vulgaris (on imuran and prednisone), who presented with painful blisters due to PV complicated by decreased oral intake and deconditioning. Dermatology was consulted and patient received rituximab infusion while in the hospital. Imuran was discontinued and prednisone dose reduced to 40mg daily.  She has a scheduled follow up appointment with Dr. Yariel Carpenter on 2/20/2019 for which the time is yet to be confirmed. She will need wound care as instructed in this paperwork. She is clinically and hemodynamically stable for discharge.

## 2019-02-15 NOTE — DISCHARGE NOTE ADULT - MEDICATION SUMMARY - MEDICATIONS TO STOP TAKING
I will STOP taking the medications listed below when I get home from the hospital:    azaTHIOprine 50 mg oral tablet  -- 2 tab(s) by mouth once a day    triamterene-hydrochlorothiazide 37.5 mg-25 mg oral tablet  -- 1 tab(s) by mouth once a day

## 2019-02-15 NOTE — DISCHARGE NOTE ADULT - CARE PROVIDER_API CALL
Yariel Carpenter)  Dermatology  [address], [address]  [place] [address]  Phone: [phone]  Fax: [phone]  Follow Up Time:

## 2019-02-15 NOTE — DISCHARGE NOTE ADULT - HOME CARE AGENCY
[facility]  [phone] .   Nurse to call and visit day after discharge 2/18/2019

## 2019-02-15 NOTE — DISCHARGE NOTE ADULT - PLAN OF CARE
Followup with dermatology You came to the hospital with worsening of your pemphigus vulgaris. Dermatology evaluated you and started you Rituximab, a strong immune suppression medication. Please followup with dermatology for your pemphigus vulgaris and monitoring of your blood cell counts. You came to the hospital with worsening of your pemphigus vulgaris. Dermatology evaluated you and started you Rituximab, a strong immune suppression medication. Please followup with dermatology for your pemphigus vulgaris and monitoring of your blood cell counts. Please continue prednisone at a dose of 40mg daily -You came to the hospital with worsening of your pemphigus vulgaris. Dermatology evaluated you and started you Rituximab, a strong immune suppression medication. Please followup with dermatology for your pemphigus vulgaris and monitoring of your blood cell counts. Please continue prednisone at a dose of 40mg daily  - You will need to follow up with Pauline Werner on either 2/18 or 2/20, please call his office to confirm the appointment.

## 2019-02-15 NOTE — DISCHARGE NOTE ADULT - INSTRUCTIONS
- Please apply mupirocin 2% ointment to open areas, covered with Xeroform, wrapped with gauze every day until follow up with dermatology office on 2/20/2019/

## 2019-02-15 NOTE — DISCHARGE NOTE ADULT - ADDITIONAL INSTRUCTIONS
- Please confirm your upcoming appointment with Dr. gutierres on 2/20/2019  - Please apply mupirocin 2% ointment to open areas, covered with Xeroform, wrapped with gauze to your wounds daily.

## 2019-02-15 NOTE — PHYSICAL THERAPY INITIAL EVALUATION ADULT - SOCIAL CONCERNS
None
Aortic stenosis    Asthma    Atrial fibrillation    BPH (benign prostatic hyperplasia)    CAD (coronary artery disease)  s/p stent 2010  CHF (congestive heart failure)    Chronic diastolic HF (heart failure)    CKD (chronic kidney disease) stage 3, GFR 30-59 ml/min    COPD (chronic obstructive pulmonary disease)    CVA (cerebral vascular accident)    Gait instability    H/O thalassemia    Hepatitis B    Hypertension    DIEGO (obstructive sleep apnea)    Pacemaker  2015 Medtronic [id number]  [id number]  PTSD (post-traumatic stress disorder)    Seizure    Tachy-idris syndrome    TIA (transient ischemic attack)  2010

## 2019-02-15 NOTE — CONSULT NOTE ADULT - ATTENDING COMMENTS
I have seen and examined Luz at the bedside with Dr. Hu and the patient's son. We discussed in detail starting Rituxan. The patient and her son verbalize understanding and would like to proceed with therapy. They will follow-up with Dr. Carpenter for longitudinal management as an outpatient.     Nicole Cha MD, DTMH

## 2019-02-15 NOTE — PROGRESS NOTE ADULT - PROBLEM SELECTOR PLAN 1
Ongoing for 2 months with no improvement in lesions. Currently on Pred 50mg qd and AZA 100mg qd   - Derm c/w prednisone and AZA: may start rituximab today    - negative Quant Gold, HBV, and HCV negative on o/p labs.   - took AZA dose 2/14:  (Had normal TMPT enzyme on o/p labs)  - Wound care - xeroform gauze for open blisters, mupirocin if tolerated, wound care consult  - pain management - percocet 5/325 q6 PRN for mod-sev pain, tylenol 650mg for mild pain  - soft diet due to pain  - magic mouthwash for oral care  - IVF LR x 12 hours due to decreased po intake and dehydration 2/2 wounds

## 2019-02-15 NOTE — PROGRESS NOTE ADULT - PROBLEM SELECTOR PLAN 5
VTE ppx: HSQ  diet: soft, dash diet  dispo: PT Likely Prerenal KWAME 2/2 poor PO intake  - trend BMP, encourage PO intake

## 2019-02-15 NOTE — DISCHARGE NOTE ADULT - CONDITIONS AT DISCHARGE
Patient a&ox4, VSS, dx with pemphis vulgaris. Generalized wounds noted, largest wound to left shoulder ([id number]). No pain noted at this time. Patient to be d/c home. Patient a&ox4, VSS, dx with pemphigus vulgaris. Generalized wounds noted, largest wound to left shoulder ([id number]). No pain noted at this time. Patient to be d/c home.

## 2019-02-15 NOTE — DISCHARGE NOTE ADULT - MEDICATION SUMMARY - MEDICATIONS TO TAKE
I will START or STAY ON the medications listed below when I get home from the hospital:    predniSONE 20 mg oral tablet  -- 2 tab(s) by mouth once a day   -- It is very important that you take or use this exactly as directed.  Do not skip doses or discontinue unless directed by your doctor.  Obtain medical advice before taking any non-prescription drugs as some may affect the action of this medication.  Take with food or milk.    -- Indication: For Pemphigus vulgaris    aspirin 81 mg oral tablet  -- 1 tab(s) by mouth once a day  -- Indication: For Heart protection    doxazosin 1 mg oral tablet  -- 1 tab(s) by mouth once a day  -- Indication: For Hypertension    escitalopram 10 mg oral tablet  -- 1 tab(s) by mouth once a day  -- Indication: For Antidepressant    atorvastatin 20 mg oral tablet  -- 1 tab(s) by mouth once a day  -- Indication: For cholesterol medication    zolpidem 5 mg oral tablet  -- 1 tab(s) by mouth once a day (at bedtime)  -- Indication: For sleeping aid    Lopressor 50 mg oral tablet  -- 1 tab(s) by mouth 2 times a day  -- Indication: For Hypertension    amLODIPine 5 mg oral tablet  -- 1 tab(s) by mouth once a day  -- Indication: For Hypertension    mupirocin 2% topical ointment  -- Apply on skin to affected area once a day. Please dispense large volume jar   -- For external use only.    -- Indication: For Pemphigus vulgaris    senna oral tablet  -- 2 tab(s) by mouth once a day (at bedtime)  -- Indication: For laxative    polyethylene glycol 3350 oral powder for reconstitution  -- 17 gram(s) by mouth once a day  -- Indication: For laxative    Omega-3 1000 mg oral capsule  -- 2 cap(s) by mouth 2 times a day  -- Indication: For supplement    pantoprazole 40 mg oral delayed release tablet  -- 1 tab(s) by mouth once a day  -- Indication: For supplementQ    levothyroxine 25 mcg (0.025 mg) oral tablet  -- 1 tab(s) by mouth once a day  -- Indication: For THYROID HORMONE    Calcium 600+D oral tablet  -- 1 tab(s) by mouth 2 times a day  -- Indication: For supplement I will START or STAY ON the medications listed below when I get home from the hospital:    glucometer  -- 1 application by transdermal patch once a day   -- Indication: For blood glucose    test strips  -- 1 application by transdermal patch once a day.   As covered by patient insurance   -- Indication: For blood glucose    lancets  -- 1 application by transdermal patch once a day (at bedtime)   -- Indication: For blood glucose    alcohol swab  -- 1 application by transdermal patch once a day   -- Indication: For blood glucose    predniSONE 20 mg oral tablet  -- 2 tab(s) by mouth once a day   -- It is very important that you take or use this exactly as directed.  Do not skip doses or discontinue unless directed by your doctor.  Obtain medical advice before taking any non-prescription drugs as some may affect the action of this medication.  Take with food or milk.    -- Indication: For Pemphigus vulgaris    aspirin 81 mg oral tablet  -- 1 tab(s) by mouth once a day  -- Indication: For Heart protection    doxazosin 1 mg oral tablet  -- 1 tab(s) by mouth once a day  -- Indication: For Hypertension    escitalopram 10 mg oral tablet  -- 1 tab(s) by mouth once a day  -- Indication: For Antidepressant    atorvastatin 20 mg oral tablet  -- 1 tab(s) by mouth once a day  -- Indication: For cholesterol medication    zolpidem 5 mg oral tablet  -- 1 tab(s) by mouth once a day (at bedtime)  -- Indication: For sleeping aid    Lopressor 50 mg oral tablet  -- 1 tab(s) by mouth 2 times a day  -- Indication: For Hypertension    amLODIPine 5 mg oral tablet  -- 1 tab(s) by mouth once a day  -- Indication: For Hypertension    mupirocin 2% topical ointment  -- Apply on skin to affected area once a day. Please dispense large volume jar   -- For external use only.    -- Indication: For Pemphigus vulgaris    senna oral tablet  -- 2 tab(s) by mouth once a day (at bedtime)  -- Indication: For laxative    polyethylene glycol 3350 oral powder for reconstitution  -- 17 gram(s) by mouth once a day  -- Indication: For laxative    Omega-3 1000 mg oral capsule  -- 2 cap(s) by mouth 2 times a day  -- Indication: For supplement    pantoprazole 40 mg oral delayed release tablet  -- 1 tab(s) by mouth once a day  -- Indication: For supplementQ    levothyroxine 25 mcg (0.025 mg) oral tablet  -- 1 tab(s) by mouth once a day  -- Indication: For THYROID HORMONE    Calcium 600+D oral tablet  -- 1 tab(s) by mouth 2 times a day  -- Indication: For supplement

## 2019-02-15 NOTE — DISCHARGE NOTE ADULT - CARE PLAN
Principal Discharge DX:	Pemphigus vulgaris  Goal:	Followup with dermatology  Assessment and plan of treatment:	You came to the hospital with worsening of your pemphigus vulgaris. Dermatology evaluated you and started you Rituximab, a strong immune suppression medication. Please followup with dermatology for your pemphigus vulgaris and monitoring of your blood cell counts. Principal Discharge DX:	Pemphigus vulgaris  Goal:	Followup with dermatology  Assessment and plan of treatment:	You came to the hospital with worsening of your pemphigus vulgaris. Dermatology evaluated you and started you Rituximab, a strong immune suppression medication. Please followup with dermatology for your pemphigus vulgaris and monitoring of your blood cell counts. Please continue prednisone at a dose of 40mg daily Principal Discharge DX:	Pemphigus vulgaris  Goal:	Followup with dermatology  Assessment and plan of treatment:	-You came to the hospital with worsening of your pemphigus vulgaris. Dermatology evaluated you and started you Rituximab, a strong immune suppression medication. Please followup with dermatology for your pemphigus vulgaris and monitoring of your blood cell counts. Please continue prednisone at a dose of 40mg daily  - You will need to follow up with Pauline Werner on either 2/18 or 2/20, please call his office to confirm the appointment.

## 2019-02-15 NOTE — CONSULT NOTE ADULT - SUBJECTIVE AND OBJECTIVE BOX
82 yo F with pemphigus vulgaris (bx proven, desmoglein 1,3 titer positive), HTN, Hypothyroidism sent in by PCP for HTN/hyperglycemia after increased dose of prednisone given for pemphigus vulgaris flare.  Patient has been taking azathioprine 100mg daily since 1/22.  Had been on prednisone 30mg until 2/12 when she was increased to 50mg prednisone by Dr. Carpenter for flare after which she started to develop elevated blood sugars and blood pressures.  HSV PCR was performed from inframammary area at visit on 2/12 and was negative.  No fever or chills.      PAST MEDICAL & SURGICAL HISTORY:  Gastroesophageal reflux disease without esophagitis  Hypothyroidism, unspecified type  Pemphigus vulgaris  Hyperlipidemia, unspecified hyperlipidemia type  Essential hypertension  No significant past surgical history      REVIEW OF SYSTEMS      General: no fevers/chills, no lethargy	    Skin/Breast: see HPI  	  Ophthalmologic: no eye pain or change in vision  	  ENMT: no dysphagia or change in hearing    Respiratory and Thorax: no SOB or cough  	  Cardiovascular: no palpitations or chest pain    Gastrointestinal: no abdominal pain or blood in stool     Genitourinary: no dysuria or frequency    Musculoskeletal: no joint pains or weakness	    Neurological:no weakness, numbness , or tingling    MEDICATIONS  (STANDING):  acetaminophen   Tablet .. 500 milliGRAM(s) Oral once  amLODIPine   Tablet 5 milliGRAM(s) Oral daily  artificial tears (preservative free) Ophthalmic Solution 1 Drop(s) Both EYES two times a day  aspirin enteric coated 81 milliGRAM(s) Oral daily  atorvastatin 20 milliGRAM(s) Oral at bedtime  azaTHIOprine 100 milliGRAM(s) Oral daily  calcium carbonate 1250 mG  + Vitamin D (OsCal 500 + D) 1 Tablet(s) Oral daily  diphenhydrAMINE 25 milliGRAM(s) Oral once  doxazosin 1 milliGRAM(s) Oral daily  escitalopram 10 milliGRAM(s) Oral daily  heparin  Injectable 5000 Unit(s) SubCutaneous every 8 hours  lactated ringers. 1000 milliLiter(s) (75 mL/Hr) IV Continuous <Continuous>  levothyroxine 25 MICROGram(s) Oral daily  metoprolol tartrate 50 milliGRAM(s) Oral two times a day  mupirocin 2% Ointment 1 Application(s) Topical two times a day  omega-3-Acid Ethyl Esters 2 Gram(s) Oral two times a day  pantoprazole    Tablet 40 milliGRAM(s) Oral before breakfast  polyethylene glycol 3350 17 Gram(s) Oral daily  predniSONE   Tablet 50 milliGRAM(s) Oral daily  riTUXimab IVPB (Non - oncologic) 1000 milliGRAM(s) IV Intermittent once  senna 2 Tablet(s) Oral at bedtime    MEDICATIONS  (PRN):  acetaminophen    Suspension .. 650 milliGRAM(s) Oral every 6 hours PRN Mild Pain (1 - 3)  diphenhydrAMINE 50 milliGRAM(s) Oral every 6 hours PRN Rash and/or Itching  oxyCODONE    5 mG/acetaminophen 325 mG 1 Tablet(s) Oral every 6 hours PRN moderate to severe pain      Allergies    No Known Allergies    Intolerances        SOCIAL HISTORY:    FAMILY HISTORY:  No pertinent family history in first degree relatives      Vital Signs Last 24 Hrs  T(C): 36.7 (15 Feb 2019 15:01), Max: 36.7 (15 Feb 2019 07:35)  T(F): 98.1 (15 Feb 2019 15:01), Max: 98.1 (15 Feb 2019 15:01)  HR: 89 (15 Feb 2019 15:01) (76 - 100)  BP: 123/83 (15 Feb 2019 15:01) (114/54 - 152/63)  BP(mean): --  RR: 18 (15 Feb 2019 15:01) (17 - 18)  SpO2: 95% (15 Feb 2019 15:01) (95% - 100%)    PHYSICAL EXAM:     The patient was alert and oriented X 3, well nourished, and in no  apparent distress.  OP showed no ulcerations  There was no visible lymphadenopathy.  Conjunctiva were non injected  There was no clubbing or edema of extremities.  The scalp, hair, face, eyebrows, lips, OP, neck, chest, back,   extremities X 4, nails were examined.  There was no hyperhidrosis or bromhidrosis.    Of note on skin exam: superficial erosions on L shoulder, inframammary area, face, oral mucosa       LABS:                        10.8   6.69  )-----------( 244      ( 15 Feb 2019 04:54 )             34.1     02-15    143  |  104  |  30<H>  ----------------------------<  89  4.2   |  29  |  1.12    Ca    8.7      15 Feb 2019 04:54  Phos  3.3     02-15  Mg     2.1     02-15    TPro  6.1  /  Alb  3.1<L>  /  TBili  < 0.2<L>  /  DBili  x   /  AST  18  /  ALT  27  /  AlkPhos  35<L>  02-14          RADIOLOGY & ADDITIONAL STUDIES: N/A    Aura Hu MD (PGY-3)   Dermatology Resident  [facility]  Pager: [phone]  Office [phone]

## 2019-02-15 NOTE — PHYSICAL THERAPY INITIAL EVALUATION ADULT - GENERAL OBSERVATIONS, REHAB EVAL
Patient received semi supine in bed, (+) IV fluids, rashes in the mouth and dressing on the lower legs, Pashto speaking, but is able to communicate basic needs in English..

## 2019-02-15 NOTE — PROGRESS NOTE ADULT - PROBLEM SELECTOR PLAN 6
VTE ppx: HSQ  diet: soft, dash diet  dispo: pending PT eval VTE ppx: HSQ  diet: soft, dash diet  dispo: home w/ home PT

## 2019-02-16 LAB
ANION GAP SERPL CALC-SCNC: 9 MMO/L — SIGNIFICANT CHANGE UP (ref 7–14)
BASOPHILS # BLD AUTO: 0.06 K/UL — SIGNIFICANT CHANGE UP (ref 0–0.2)
BASOPHILS NFR BLD AUTO: 0.7 % — SIGNIFICANT CHANGE UP (ref 0–2)
BUN SERPL-MCNC: 23 MG/DL — SIGNIFICANT CHANGE UP (ref 7–23)
CALCIUM SERPL-MCNC: 8.5 MG/DL — SIGNIFICANT CHANGE UP (ref 8.4–10.5)
CHLORIDE SERPL-SCNC: 103 MMOL/L — SIGNIFICANT CHANGE UP (ref 98–107)
CO2 SERPL-SCNC: 29 MMOL/L — SIGNIFICANT CHANGE UP (ref 22–31)
CREAT SERPL-MCNC: 1.01 MG/DL — SIGNIFICANT CHANGE UP (ref 0.5–1.3)
EOSINOPHIL # BLD AUTO: 0.18 K/UL — SIGNIFICANT CHANGE UP (ref 0–0.5)
EOSINOPHIL NFR BLD AUTO: 2 % — SIGNIFICANT CHANGE UP (ref 0–6)
GLUCOSE SERPL-MCNC: 84 MG/DL — SIGNIFICANT CHANGE UP (ref 70–99)
HCT VFR BLD CALC: 36.6 % — SIGNIFICANT CHANGE UP (ref 34.5–45)
HGB BLD-MCNC: 11.6 G/DL — SIGNIFICANT CHANGE UP (ref 11.5–15.5)
IMM GRANULOCYTES NFR BLD AUTO: 3.7 % — HIGH (ref 0–1.5)
LYMPHOCYTES # BLD AUTO: 2 K/UL — SIGNIFICANT CHANGE UP (ref 1–3.3)
LYMPHOCYTES # BLD AUTO: 22.1 % — SIGNIFICANT CHANGE UP (ref 13–44)
MAGNESIUM SERPL-MCNC: 1.9 MG/DL — SIGNIFICANT CHANGE UP (ref 1.6–2.6)
MCHC RBC-ENTMCNC: 31 PG — SIGNIFICANT CHANGE UP (ref 27–34)
MCHC RBC-ENTMCNC: 31.7 % — LOW (ref 32–36)
MCV RBC AUTO: 97.9 FL — SIGNIFICANT CHANGE UP (ref 80–100)
MONOCYTES # BLD AUTO: 0.96 K/UL — HIGH (ref 0–0.9)
MONOCYTES NFR BLD AUTO: 10.6 % — SIGNIFICANT CHANGE UP (ref 2–14)
NEUTROPHILS # BLD AUTO: 5.5 K/UL — SIGNIFICANT CHANGE UP (ref 1.8–7.4)
NEUTROPHILS NFR BLD AUTO: 60.9 % — SIGNIFICANT CHANGE UP (ref 43–77)
NRBC # FLD: 0 K/UL — LOW (ref 25–125)
PHOSPHATE SERPL-MCNC: 2.9 MG/DL — SIGNIFICANT CHANGE UP (ref 2.5–4.5)
PLATELET # BLD AUTO: 219 K/UL — SIGNIFICANT CHANGE UP (ref 150–400)
PMV BLD: 9.1 FL — SIGNIFICANT CHANGE UP (ref 7–13)
POTASSIUM SERPL-MCNC: 4.1 MMOL/L — SIGNIFICANT CHANGE UP (ref 3.5–5.3)
POTASSIUM SERPL-SCNC: 4.1 MMOL/L — SIGNIFICANT CHANGE UP (ref 3.5–5.3)
RBC # BLD: 3.74 M/UL — LOW (ref 3.8–5.2)
RBC # FLD: 15.3 % — HIGH (ref 10.3–14.5)
SODIUM SERPL-SCNC: 141 MMOL/L — SIGNIFICANT CHANGE UP (ref 135–145)
WBC # BLD: 9.03 K/UL — SIGNIFICANT CHANGE UP (ref 3.8–10.5)
WBC # FLD AUTO: 9.03 K/UL — SIGNIFICANT CHANGE UP (ref 3.8–10.5)

## 2019-02-16 PROCEDURE — 99233 SBSQ HOSP IP/OBS HIGH 50: CPT | Mod: GC

## 2019-02-16 RX ORDER — RITUXIMAB 10 MG/ML
1000 INJECTION, SOLUTION INTRAVENOUS ONCE
Refills: 0 | Status: COMPLETED | OUTPATIENT
Start: 2019-02-16 | End: 2019-02-16

## 2019-02-16 RX ORDER — AER TRAVELER 0.5 G/1
1 SOLUTION RECTAL; TOPICAL
Refills: 0 | Status: DISCONTINUED | OUTPATIENT
Start: 2019-02-16 | End: 2019-02-17

## 2019-02-16 RX ORDER — DIPHENHYDRAMINE HCL 50 MG
50 CAPSULE ORAL ONCE
Refills: 0 | Status: DISCONTINUED | OUTPATIENT
Start: 2019-02-16 | End: 2019-02-17

## 2019-02-16 RX ORDER — ACETAMINOPHEN 500 MG
650 TABLET ORAL ONCE
Refills: 0 | Status: COMPLETED | OUTPATIENT
Start: 2019-02-16 | End: 2019-02-16

## 2019-02-16 RX ORDER — DIPHENHYDRAMINE HCL 50 MG
25 CAPSULE ORAL ONCE
Refills: 0 | Status: COMPLETED | OUTPATIENT
Start: 2019-02-16 | End: 2019-02-16

## 2019-02-16 RX ORDER — MEPERIDINE HYDROCHLORIDE 50 MG/ML
12.5 INJECTION INTRAMUSCULAR; INTRAVENOUS; SUBCUTANEOUS ONCE
Refills: 0 | Status: DISCONTINUED | OUTPATIENT
Start: 2019-02-16 | End: 2019-02-17

## 2019-02-16 RX ORDER — HYDROCORTISONE 20 MG
100 TABLET ORAL ONCE
Refills: 0 | Status: DISCONTINUED | OUTPATIENT
Start: 2019-02-16 | End: 2019-02-17

## 2019-02-16 RX ADMIN — HEPARIN SODIUM 5000 UNIT(S): 5000 INJECTION INTRAVENOUS; SUBCUTANEOUS at 13:49

## 2019-02-16 RX ADMIN — Medication 25 MILLIGRAM(S): at 11:22

## 2019-02-16 RX ADMIN — Medication 2 GRAM(S): at 07:15

## 2019-02-16 RX ADMIN — Medication 1 DROP(S): at 18:50

## 2019-02-16 RX ADMIN — MUPIROCIN 1 APPLICATION(S): 20 OINTMENT TOPICAL at 07:16

## 2019-02-16 RX ADMIN — Medication 1 TABLET(S): at 13:49

## 2019-02-16 RX ADMIN — HEPARIN SODIUM 5000 UNIT(S): 5000 INJECTION INTRAVENOUS; SUBCUTANEOUS at 22:45

## 2019-02-16 RX ADMIN — RITUXIMAB 1000 MILLIGRAM(S): 10 INJECTION, SOLUTION INTRAVENOUS at 12:10

## 2019-02-16 RX ADMIN — Medication 50 MILLIGRAM(S): at 18:50

## 2019-02-16 RX ADMIN — Medication 650 MILLIGRAM(S): at 11:22

## 2019-02-16 RX ADMIN — Medication 25 MICROGRAM(S): at 07:15

## 2019-02-16 RX ADMIN — SENNA PLUS 2 TABLET(S): 8.6 TABLET ORAL at 22:45

## 2019-02-16 RX ADMIN — Medication 50 MILLIGRAM(S): at 07:15

## 2019-02-16 RX ADMIN — HEPARIN SODIUM 5000 UNIT(S): 5000 INJECTION INTRAVENOUS; SUBCUTANEOUS at 07:16

## 2019-02-16 RX ADMIN — ATORVASTATIN CALCIUM 20 MILLIGRAM(S): 80 TABLET, FILM COATED ORAL at 22:45

## 2019-02-16 RX ADMIN — AMLODIPINE BESYLATE 5 MILLIGRAM(S): 2.5 TABLET ORAL at 07:15

## 2019-02-16 RX ADMIN — MUPIROCIN 1 APPLICATION(S): 20 OINTMENT TOPICAL at 18:50

## 2019-02-16 RX ADMIN — Medication 1 DROP(S): at 07:16

## 2019-02-16 RX ADMIN — POLYETHYLENE GLYCOL 3350 17 GRAM(S): 17 POWDER, FOR SOLUTION ORAL at 13:48

## 2019-02-16 RX ADMIN — Medication 650 MILLIGRAM(S): at 13:30

## 2019-02-16 RX ADMIN — Medication 1 MILLIGRAM(S): at 07:15

## 2019-02-16 RX ADMIN — Medication 2 GRAM(S): at 18:50

## 2019-02-16 RX ADMIN — Medication 81 MILLIGRAM(S): at 13:49

## 2019-02-16 RX ADMIN — PANTOPRAZOLE SODIUM 40 MILLIGRAM(S): 20 TABLET, DELAYED RELEASE ORAL at 07:15

## 2019-02-16 RX ADMIN — ESCITALOPRAM OXALATE 10 MILLIGRAM(S): 10 TABLET, FILM COATED ORAL at 13:49

## 2019-02-16 NOTE — PROGRESS NOTE ADULT - PROBLEM SELECTOR PLAN 1
Ongoing for 2 months with no improvement in lesions. Currently on Pred 50mg qd and AZA 100mg qd   - Derm c/w prednisone and start rituximab today  - decrease prednisone dose following rituximab     - negative Quant Gold, HBV, and HCV negative on o/p labs.   - took AZA dose 2/14:  (Had normal TMPT enzyme on o/p labs)  - Wound care - xeroform gauze for open blisters, mupirocin if tolerated, wound care consult  - pain management - percocet 5/325 q6 PRN for mod-sev pain, tylenol 650mg for mild pain  - soft diet due to pain  - magic mouthwash for oral care  - IVF LR x 12 hours due to decreased po intake and dehydration 2/2 wounds

## 2019-02-16 NOTE — PROGRESS NOTE ADULT - SUBJECTIVE AND OBJECTIVE BOX
Authored by Dr Kings Doherty [phone] [facility] / [id number] [facility]    Patient is a 83y old  Female who presents with a chief complaint of Pemphigus vulgaris (15 Feb 2019 18:24)    SUBJECTIVE / OVERNIGHT EVENTS: No acute events overnight     This morning pt reports she is feeling well without complaints    MEDICATIONS  (STANDING):  acetaminophen   Tablet .. 650 milliGRAM(s) Oral once  amLODIPine   Tablet 5 milliGRAM(s) Oral daily  artificial tears (preservative free) Ophthalmic Solution 1 Drop(s) Both EYES two times a day  aspirin enteric coated 81 milliGRAM(s) Oral daily  atorvastatin 20 milliGRAM(s) Oral at bedtime  calcium carbonate 1250 mG  + Vitamin D (OsCal 500 + D) 1 Tablet(s) Oral daily  diphenhydrAMINE 25 milliGRAM(s) Oral once  doxazosin 1 milliGRAM(s) Oral daily  escitalopram 10 milliGRAM(s) Oral daily  heparin  Injectable 5000 Unit(s) SubCutaneous every 8 hours  lactated ringers. 1000 milliLiter(s) (75 mL/Hr) IV Continuous <Continuous>  levothyroxine 25 MICROGram(s) Oral daily  metoprolol tartrate 50 milliGRAM(s) Oral two times a day  mupirocin 2% Ointment 1 Application(s) Topical two times a day  omega-3-Acid Ethyl Esters 2 Gram(s) Oral two times a day  pantoprazole    Tablet 40 milliGRAM(s) Oral before breakfast  polyethylene glycol 3350 17 Gram(s) Oral daily  predniSONE   Tablet 50 milliGRAM(s) Oral daily  riTUXimab IVPB (Non - oncologic) 1000 milliGRAM(s) IV Intermittent once  senna 2 Tablet(s) Oral at bedtime    MEDICATIONS  (PRN):  acetaminophen    Suspension .. 650 milliGRAM(s) Oral every 6 hours PRN Mild Pain (1 - 3)  diphenhydrAMINE 50 milliGRAM(s) Oral every 6 hours PRN Rash and/or Itching  diphenhydrAMINE   Injectable 50 milliGRAM(s) IV Push once PRN PRN Chemotherpay reaction; 2/16/19  hydrocortisone sodium succinate Injectable 100 milliGRAM(s) IV Push once PRN PRN Chemotherpay reaction; 2/16/19  meperidine     Injectable 12.5 milliGRAM(s) IV Push once PRN PRN Chemotherpay reaction (Rigors); 2/16/19  meperidine     Injectable 12.5 milliGRAM(s) IV Push once PRN PRN Chemotherpay reaction (Rigors, Repeat Dose); 2/16/19  oxyCODONE    5 mG/acetaminophen 325 mG 1 Tablet(s) Oral every 6 hours PRN moderate to severe pain      Vital Signs Last 24 Hrs  T(C): 36.6 (16 Feb 2019 07:12), Max: 36.7 (15 Feb 2019 15:01)  T(F): 97.9 (16 Feb 2019 07:12), Max: 98.1 (15 Feb 2019 15:01)  HR: 92 (16 Feb 2019 07:12) (80 - 92)  BP: 166/67 (16 Feb 2019 07:12) (123/83 - 166/67)  BP(mean): --  RR: 18 (16 Feb 2019 07:12) (18 - 18)  SpO2: 95% (16 Feb 2019 07:12) (95% - 95%)  CAPILLARY BLOOD GLUCOSE        I&O's Summary      PHYSICAL EXAM  GENERAL: NAD, resting comfortably in bed	  HEAD:  Atraumatic, Normocephalic  EYES: EOMI, PERRLA, conjunctiva and sclera clear  ENMT: No tonsillar erythema, exudates, or enlargement; dry mucous membranes, poor dentition, crusted lip lesion along right and lower aspect.   NECK: Supple, No JVD  CHEST/LUNG: Clear to auscultation bilaterally; No rales, rhonchi, wheezing  HEART: Regular rate and rhythm; No murmurs, rubs, or gallops  ABDOMEN: Soft, Nontender, Nondistended; Bowel sounds present  EXTREMITIES:  2+ Peripheral Pulses, b/l non pitting edema of LEs. No clubbing, cyanosis  LYMPH: No lymphadenopathy noted  SKIN: open blisters apprx fist sized along back of neck, left shoulder and elbow with crusted blood, Left breast, upper left abdomen left ankle; ecchymosis of left lower leg.   NERVOUS SYSTEM:  Alert & Oriented X3, Good concentration; No focal deficits.    LABS:                        11.6   9.03  )-----------( 219      ( 16 Feb 2019 06:30 )             36.6     02-16    141  |  103  |  23  ----------------------------<  84  4.1   |  29  |  1.01    Ca    8.5      16 Feb 2019 06:30  Phos  2.9     02-16  Mg     1.9     02-16    TPro  6.1  /  Alb  3.1<L>  /  TBili  < 0.2<L>  /  DBili  x   /  AST  18  /  ALT  27  /  AlkPhos  35<L>  02-14                RADIOLOGY & ADDITIONAL TESTS:    Imaging Personally Reviewed:  Consultant(s) Notes Reviewed:  Derm  Care Discussed with Consultants/Other Providers: Derm

## 2019-02-17 VITALS
OXYGEN SATURATION: 97 % | DIASTOLIC BLOOD PRESSURE: 65 MMHG | RESPIRATION RATE: 19 BRPM | HEART RATE: 97 BPM | SYSTOLIC BLOOD PRESSURE: 132 MMHG | TEMPERATURE: 98 F

## 2019-02-17 LAB
ANION GAP SERPL CALC-SCNC: 12 MMO/L — SIGNIFICANT CHANGE UP (ref 7–14)
BASOPHILS # BLD AUTO: 0.05 K/UL — SIGNIFICANT CHANGE UP (ref 0–0.2)
BASOPHILS NFR BLD AUTO: 0.7 % — SIGNIFICANT CHANGE UP (ref 0–2)
BUN SERPL-MCNC: 18 MG/DL — SIGNIFICANT CHANGE UP (ref 7–23)
CALCIUM SERPL-MCNC: 8.2 MG/DL — LOW (ref 8.4–10.5)
CHLORIDE SERPL-SCNC: 102 MMOL/L — SIGNIFICANT CHANGE UP (ref 98–107)
CO2 SERPL-SCNC: 26 MMOL/L — SIGNIFICANT CHANGE UP (ref 22–31)
CREAT SERPL-MCNC: 0.93 MG/DL — SIGNIFICANT CHANGE UP (ref 0.5–1.3)
EOSINOPHIL # BLD AUTO: 0.24 K/UL — SIGNIFICANT CHANGE UP (ref 0–0.5)
EOSINOPHIL NFR BLD AUTO: 3.6 % — SIGNIFICANT CHANGE UP (ref 0–6)
GLUCOSE SERPL-MCNC: 127 MG/DL — HIGH (ref 70–99)
HCT VFR BLD CALC: 36.5 % — SIGNIFICANT CHANGE UP (ref 34.5–45)
HGB BLD-MCNC: 11.4 G/DL — LOW (ref 11.5–15.5)
IMM GRANULOCYTES NFR BLD AUTO: 3.9 % — HIGH (ref 0–1.5)
LYMPHOCYTES # BLD AUTO: 1.05 K/UL — SIGNIFICANT CHANGE UP (ref 1–3.3)
LYMPHOCYTES # BLD AUTO: 15.7 % — SIGNIFICANT CHANGE UP (ref 13–44)
MAGNESIUM SERPL-MCNC: 1.6 MG/DL — SIGNIFICANT CHANGE UP (ref 1.6–2.6)
MCHC RBC-ENTMCNC: 30.2 PG — SIGNIFICANT CHANGE UP (ref 27–34)
MCHC RBC-ENTMCNC: 31.2 % — LOW (ref 32–36)
MCV RBC AUTO: 96.6 FL — SIGNIFICANT CHANGE UP (ref 80–100)
MONOCYTES # BLD AUTO: 0.29 K/UL — SIGNIFICANT CHANGE UP (ref 0–0.9)
MONOCYTES NFR BLD AUTO: 4.3 % — SIGNIFICANT CHANGE UP (ref 2–14)
NEUTROPHILS # BLD AUTO: 4.79 K/UL — SIGNIFICANT CHANGE UP (ref 1.8–7.4)
NEUTROPHILS NFR BLD AUTO: 71.8 % — SIGNIFICANT CHANGE UP (ref 43–77)
NRBC # FLD: 0 K/UL — LOW (ref 25–125)
PHOSPHATE SERPL-MCNC: 2.4 MG/DL — LOW (ref 2.5–4.5)
PLATELET # BLD AUTO: 220 K/UL — SIGNIFICANT CHANGE UP (ref 150–400)
PMV BLD: 9.1 FL — SIGNIFICANT CHANGE UP (ref 7–13)
POTASSIUM SERPL-MCNC: 3.6 MMOL/L — SIGNIFICANT CHANGE UP (ref 3.5–5.3)
POTASSIUM SERPL-SCNC: 3.6 MMOL/L — SIGNIFICANT CHANGE UP (ref 3.5–5.3)
RBC # BLD: 3.78 M/UL — LOW (ref 3.8–5.2)
RBC # FLD: 15.4 % — HIGH (ref 10.3–14.5)
SODIUM SERPL-SCNC: 140 MMOL/L — SIGNIFICANT CHANGE UP (ref 135–145)
WBC # BLD: 6.68 K/UL — SIGNIFICANT CHANGE UP (ref 3.8–10.5)
WBC # FLD AUTO: 6.68 K/UL — SIGNIFICANT CHANGE UP (ref 3.8–10.5)

## 2019-02-17 PROCEDURE — 99239 HOSP IP/OBS DSCHRG MGMT >30: CPT

## 2019-02-17 RX ORDER — MUPIROCIN 20 MG/G
1 OINTMENT TOPICAL
Qty: 454 | Refills: 0
Start: 2019-02-17 | End: 2019-03-19

## 2019-02-17 RX ADMIN — Medication 50 MILLIGRAM(S): at 06:54

## 2019-02-17 RX ADMIN — MUPIROCIN 1 APPLICATION(S): 20 OINTMENT TOPICAL at 09:57

## 2019-02-17 RX ADMIN — Medication 1 TABLET(S): at 11:57

## 2019-02-17 RX ADMIN — POLYETHYLENE GLYCOL 3350 17 GRAM(S): 17 POWDER, FOR SOLUTION ORAL at 11:58

## 2019-02-17 RX ADMIN — HEPARIN SODIUM 5000 UNIT(S): 5000 INJECTION INTRAVENOUS; SUBCUTANEOUS at 06:54

## 2019-02-17 RX ADMIN — PANTOPRAZOLE SODIUM 40 MILLIGRAM(S): 20 TABLET, DELAYED RELEASE ORAL at 06:54

## 2019-02-17 RX ADMIN — Medication 81 MILLIGRAM(S): at 11:58

## 2019-02-17 RX ADMIN — Medication 40 MILLIGRAM(S): at 06:54

## 2019-02-17 RX ADMIN — Medication 25 MICROGRAM(S): at 06:58

## 2019-02-17 RX ADMIN — Medication 1 MILLIGRAM(S): at 06:58

## 2019-02-17 RX ADMIN — Medication 1 DROP(S): at 06:54

## 2019-02-17 RX ADMIN — Medication 2 GRAM(S): at 06:54

## 2019-02-17 RX ADMIN — HEPARIN SODIUM 5000 UNIT(S): 5000 INJECTION INTRAVENOUS; SUBCUTANEOUS at 13:52

## 2019-02-17 RX ADMIN — ESCITALOPRAM OXALATE 10 MILLIGRAM(S): 10 TABLET, FILM COATED ORAL at 11:57

## 2019-02-17 RX ADMIN — AMLODIPINE BESYLATE 5 MILLIGRAM(S): 2.5 TABLET ORAL at 06:54

## 2019-02-17 NOTE — PROGRESS NOTE ADULT - PROBLEM SELECTOR PLAN 1
Ongoing for 2 months with no improvement in lesions. Currently on Pred 50mg qd and AZA 100mg qd   - Derm c/w prednisone and start rituximab today  - decrease prednisone dose following rituximab     - negative Quant Gold, HBV, and HCV negative on o/p labs.   - took AZA dose 2/14:  (Had normal TMPT enzyme on o/p labs)  - Wound care - xeroform gauze for open blisters, mupirocin if tolerated, wound care consult  - pain management - percocet 5/325 q6 PRN for mod-sev pain, tylenol 650mg for mild pain  - soft diet due to pain  - magic mouthwash for oral care  - IVF LR x 12 hours due to decreased po intake and dehydration 2/2 wounds Ongoing for 2 months with no improvement in lesions. Currently on Pred 50mg qd   - Tolerated rituxin infusion without problems  - Derm recs appreciated, will switch to prednisone 40mg daily    - negative Quant Gold, HBV, and HCV negative on o/p labs.   - took AZA dose 2/14:  (Had normal TMPT enzyme on o/p labs)  - Wound care - xeroform gauze for open blisters, mupirocin if tolerated, wound care consult  - pain management - percocet 5/325 q6 PRN for mod-sev pain, tylenol 650mg for mild pain  - soft diet due to pain  - magic mouthwash for oral care  - IVF LR x 12 hours due to decreased po intake and dehydration 2/2 wounds  - called dermatology who did not provide specific appointment w Dr. Carpenter, discussed patient will be instructed to clinic appt on 2/20 and dermatology to call on 2/19 and confirm appointment.

## 2019-02-17 NOTE — PROGRESS NOTE ADULT - ASSESSMENT
84 yo w/ GERD, HTN, Hypothyroidism, Pemphigus vulgaris, presents with painful blisters 2/2 PV c/b decreased PO intake and deconditioning.
84 yo w/ GERD, HTN, Hypothyroidism, Pemphigus vulgaris, presents with painful blisters 2/2 PV c/b decreased PO intake and deconditioning. Pending rituximab infusion today
84 yo w/ GERD, HTN, Hypothyroidism, Pemphigus vulgaris, presents with painful blisters 2/2 PV c/b decreased PO intake and deconditioning. Pending rituximab infusion today

## 2019-02-17 NOTE — PROGRESS NOTE ADULT - ATTENDING COMMENTS
Tolerated Rituxan infusion yesterday  Doing well, no complaints  Medically stable for d/c home today w/ OP f/u Derm - either 2/18 or 2/20     35 min spent on dc planning
receiving Rituxan IV today  d/c planning tomorrow if stable
Per team verbal discussion w/ Derm, likely to start Rituxan inpatient  Await official Derm c/s    PT-> home w/ home PT  plan of care d/w pt & son at bedside

## 2019-02-17 NOTE — PROGRESS NOTE ADULT - SUBJECTIVE AND OBJECTIVE BOX
Patient is a 83y old  Female who presents with a chief complaint of Pemphigus vulgaris (16 Feb 2019 10:04)        SUBJECTIVE / OVERNIGHT EVENTS:      MEDICATIONS  (STANDING):  amLODIPine   Tablet 5 milliGRAM(s) Oral daily  artificial tears (preservative free) Ophthalmic Solution 1 Drop(s) Both EYES two times a day  aspirin enteric coated 81 milliGRAM(s) Oral daily  atorvastatin 20 milliGRAM(s) Oral at bedtime  calcium carbonate 1250 mG  + Vitamin D (OsCal 500 + D) 1 Tablet(s) Oral daily  doxazosin 1 milliGRAM(s) Oral daily  escitalopram 10 milliGRAM(s) Oral daily  heparin  Injectable 5000 Unit(s) SubCutaneous every 8 hours  lactated ringers. 1000 milliLiter(s) (75 mL/Hr) IV Continuous <Continuous>  levothyroxine 25 MICROGram(s) Oral daily  metoprolol tartrate 50 milliGRAM(s) Oral two times a day  mupirocin 2% Ointment 1 Application(s) Topical two times a day  omega-3-Acid Ethyl Esters 2 Gram(s) Oral two times a day  pantoprazole    Tablet 40 milliGRAM(s) Oral before breakfast  polyethylene glycol 3350 17 Gram(s) Oral daily  predniSONE   Tablet 40 milliGRAM(s) Oral daily  senna 2 Tablet(s) Oral at bedtime    MEDICATIONS  (PRN):  acetaminophen    Suspension .. 650 milliGRAM(s) Oral every 6 hours PRN Mild Pain (1 - 3)  diphenhydrAMINE 50 milliGRAM(s) Oral every 6 hours PRN Rash and/or Itching  diphenhydrAMINE   Injectable 50 milliGRAM(s) IV Push once PRN PRN Chemotherpay reaction; 2/16/19  hydrocortisone sodium succinate Injectable 100 milliGRAM(s) IV Push once PRN PRN Chemotherpay reaction; 2/16/19  meperidine     Injectable 12.5 milliGRAM(s) IV Push once PRN PRN Chemotherpay reaction (Rigors); 2/16/19  meperidine     Injectable 12.5 milliGRAM(s) IV Push once PRN PRN Chemotherpay reaction (Rigors, Repeat Dose); 2/16/19  oxyCODONE    5 mG/acetaminophen 325 mG 1 Tablet(s) Oral every 6 hours PRN moderate to severe pain  witch hazel Pads 1 Application(s) Topical two times a day PRN hemorhoids        CAPILLARY BLOOD GLUCOSE        I&O's Summary    16 Feb 2019 07:01  -  17 Feb 2019 07:00  --------------------------------------------------------  IN: 240 mL / OUT: 0 mL / NET: 240 mL        PHYSICAL EXAM  GENERAL: NAD, well-developed  HEAD:  Atraumatic, Normocephalic  EYES: EOMI, PERRLA, conjunctiva and sclera clear  NECK: Supple, No JVD  CHEST/LUNG: Clear to auscultation bilaterally; No wheeze  HEART: Regular rate and rhythm; No murmurs, rubs, or gallops  ABDOMEN: Soft, Nontender, Nondistended; Bowel sounds present  EXTREMITIES:  2+ Peripheral Pulses, No clubbing, cyanosis, or edema  PSYCH: AAOx3  SKIN: No rashes or lesions    LABS:                        11.4   6.68  )-----------( 220      ( 17 Feb 2019 05:20 )             36.5     02-17    140  |  102  |  18  ----------------------------<  127<H>  3.6   |  26  |  0.93    Ca    8.2<L>      17 Feb 2019 05:20  Phos  2.4     02-17  Mg     1.6     02-17                RADIOLOGY & ADDITIONAL TESTS:    Imaging Personally Reviewed:  Consultant(s) Notes Reviewed:    Care Discussed with Consultants/Other Providers: Patient is a 83y old  Female who presents with a chief complaint of Pemphigus vulgaris (16 Feb 2019 10:04)        SUBJECTIVE / OVERNIGHT EVENTS: No overnight events. Feels well overall, states she wants to go home.       MEDICATIONS  (STANDING):  amLODIPine   Tablet 5 milliGRAM(s) Oral daily  artificial tears (preservative free) Ophthalmic Solution 1 Drop(s) Both EYES two times a day  aspirin enteric coated 81 milliGRAM(s) Oral daily  atorvastatin 20 milliGRAM(s) Oral at bedtime  calcium carbonate 1250 mG  + Vitamin D (OsCal 500 + D) 1 Tablet(s) Oral daily  doxazosin 1 milliGRAM(s) Oral daily  escitalopram 10 milliGRAM(s) Oral daily  heparin  Injectable 5000 Unit(s) SubCutaneous every 8 hours  lactated ringers. 1000 milliLiter(s) (75 mL/Hr) IV Continuous <Continuous>  levothyroxine 25 MICROGram(s) Oral daily  metoprolol tartrate 50 milliGRAM(s) Oral two times a day  mupirocin 2% Ointment 1 Application(s) Topical two times a day  omega-3-Acid Ethyl Esters 2 Gram(s) Oral two times a day  pantoprazole    Tablet 40 milliGRAM(s) Oral before breakfast  polyethylene glycol 3350 17 Gram(s) Oral daily  predniSONE   Tablet 40 milliGRAM(s) Oral daily  senna 2 Tablet(s) Oral at bedtime    MEDICATIONS  (PRN):  acetaminophen    Suspension .. 650 milliGRAM(s) Oral every 6 hours PRN Mild Pain (1 - 3)  diphenhydrAMINE 50 milliGRAM(s) Oral every 6 hours PRN Rash and/or Itching  diphenhydrAMINE   Injectable 50 milliGRAM(s) IV Push once PRN PRN Chemotherpay reaction; 2/16/19  hydrocortisone sodium succinate Injectable 100 milliGRAM(s) IV Push once PRN PRN Chemotherpay reaction; 2/16/19  meperidine     Injectable 12.5 milliGRAM(s) IV Push once PRN PRN Chemotherpay reaction (Rigors); 2/16/19  meperidine     Injectable 12.5 milliGRAM(s) IV Push once PRN PRN Chemotherpay reaction (Rigors, Repeat Dose); 2/16/19  oxyCODONE    5 mG/acetaminophen 325 mG 1 Tablet(s) Oral every 6 hours PRN moderate to severe pain  witch hazel Pads 1 Application(s) Topical two times a day PRN hemorhoids        CAPILLARY BLOOD GLUCOSE        I&O's Summary    16 Feb 2019 07:01  -  17 Feb 2019 07:00  --------------------------------------------------------  IN: 240 mL / OUT: 0 mL / NET: 240 mL        PHYSICAL EXAM  GENERAL: NAD, well-developed  HEAD:  Atraumatic, Normocephalic  EYES: EOMI, PERRLA, conjunctiva and sclera clear  NECK: Supple, No JVD  CHEST/LUNG: Clear to auscultation bilaterally; No wheeze  HEART: Regular rate and rhythm; No murmurs, rubs, or gallops  ABDOMEN: Soft, Nontender, Nondistended; Bowel sounds present  EXTREMITIES:  2+ Peripheral Pulses, No clubbing, cyanosis, or edema  PSYCH: AAOx3  SKIN: crusted bullae of face, arms, legs and posterior neck    LABS:                        11.4   6.68  )-----------( 220      ( 17 Feb 2019 05:20 )             36.5     02-17    140  |  102  |  18  ----------------------------<  127<H>  3.6   |  26  |  0.93    Ca    8.2<L>      17 Feb 2019 05:20  Phos  2.4     02-17  Mg     1.6     02-17                RADIOLOGY & ADDITIONAL TESTS:    Imaging Personally Reviewed:  Consultant(s) Notes Reviewed:    Care Discussed with Consultants/Other Providers:

## 2019-02-17 NOTE — PROGRESS NOTE ADULT - PROBLEM SELECTOR PLAN 3
cont levothyroxine 25mcg qd  - Free Triiodothyronine 1.79 (normal 1.80)
cont levothyroxine 25mcg qd  - f/u tsh, ft4, ft4
cont levothyroxine 25mcg qd  - Free Triiodothyronine 1.79 (normal 1.80)

## 2019-02-17 NOTE — PROGRESS NOTE ADULT - PROBLEM SELECTOR PLAN 6
VTE ppx: HSQ  diet: soft, dash diet  dispo: home w/ home PT VTE ppx: HSQ  diet: soft, dash diet  dispo: home w/ home PT, likely home today

## 2019-02-22 ENCOUNTER — APPOINTMENT (OUTPATIENT)
Dept: DERMATOLOGY | Facility: CLINIC | Age: 84
End: 2019-02-22
Payer: MEDICARE

## 2019-02-22 VITALS — SYSTOLIC BLOOD PRESSURE: 124 MMHG | DIASTOLIC BLOOD PRESSURE: 70 MMHG

## 2019-02-22 PROCEDURE — 99214 OFFICE O/P EST MOD 30 MIN: CPT

## 2019-02-22 RX ORDER — ATORVASTATIN CALCIUM 20 MG/1
20 TABLET, FILM COATED ORAL
Qty: 90 | Refills: 0 | Status: ACTIVE | COMMUNITY
Start: 2018-10-05

## 2019-02-22 RX ORDER — FUROSEMIDE 20 MG/1
20 TABLET ORAL
Qty: 30 | Refills: 0 | Status: ACTIVE | COMMUNITY
Start: 2019-02-20

## 2019-02-22 RX ORDER — FLUTICASONE PROPIONATE 50 UG/1
50 SPRAY, METERED NASAL
Qty: 16 | Refills: 0 | Status: ACTIVE | COMMUNITY
Start: 2018-09-18

## 2019-02-22 RX ORDER — MUPIROCIN 20 MG/G
2 OINTMENT TOPICAL
Qty: 2 | Refills: 5 | Status: ACTIVE | COMMUNITY
Start: 2018-12-28 | End: 1900-01-01

## 2019-02-22 RX ORDER — PREDNISONE 20 MG/1
20 TABLET ORAL
Qty: 60 | Refills: 1 | Status: ACTIVE | COMMUNITY
Start: 2019-02-17 | End: 1900-01-01

## 2019-02-22 RX ORDER — METHYLPREDNISOLONE 4 MG/1
4 TABLET ORAL
Qty: 21 | Refills: 0 | Status: ACTIVE | COMMUNITY
Start: 2018-12-28

## 2019-02-22 RX ORDER — ZOLPIDEM TARTRATE 5 MG/1
5 TABLET ORAL
Qty: 30 | Refills: 0 | Status: ACTIVE | COMMUNITY
Start: 2019-02-12

## 2019-02-22 RX ORDER — AZITHROMYCIN 250 MG/1
250 TABLET, FILM COATED ORAL
Qty: 6 | Refills: 0 | Status: ACTIVE | COMMUNITY
Start: 2018-12-10

## 2019-02-22 RX ORDER — RANITIDINE HYDROCHLORIDE 150 MG/1
150 CAPSULE ORAL
Qty: 30 | Refills: 0 | Status: ACTIVE | COMMUNITY
Start: 2018-12-07

## 2019-02-22 RX ORDER — IPRATROPIUM BROMIDE 21 UG/1
0.03 SPRAY NASAL
Qty: 30 | Refills: 0 | Status: ACTIVE | COMMUNITY
Start: 2018-09-06

## 2019-02-22 RX ORDER — POTASSIUM CHLORIDE 750 MG/1
10 TABLET, FILM COATED, EXTENDED RELEASE ORAL
Qty: 30 | Refills: 0 | Status: ACTIVE | COMMUNITY
Start: 2019-02-20

## 2019-02-22 RX ORDER — DOXYCYCLINE HYCLATE 100 MG/1
100 CAPSULE ORAL
Qty: 20 | Refills: 0 | Status: ACTIVE | COMMUNITY
Start: 2018-11-21

## 2019-02-22 RX ORDER — LEVOTHYROXINE SODIUM 0.03 MG/1
25 TABLET ORAL
Qty: 90 | Refills: 0 | Status: ACTIVE | COMMUNITY
Start: 2018-10-05

## 2019-02-22 RX ORDER — SILVER SULFADIAZINE 10 MG/G
1 CREAM TOPICAL
Qty: 400 | Refills: 0 | Status: ACTIVE | COMMUNITY
Start: 2019-02-13

## 2019-02-22 RX ORDER — SULFAMETHOXAZOLE AND TRIMETHOPRIM 800; 160 MG/1; MG/1
800-160 TABLET ORAL
Qty: 14 | Refills: 0 | Status: ACTIVE | COMMUNITY
Start: 2018-12-20

## 2019-02-22 RX ORDER — CEFUROXIME AXETIL 250 MG/1
250 TABLET ORAL
Qty: 20 | Refills: 0 | Status: ACTIVE | COMMUNITY
Start: 2018-09-20

## 2019-02-22 RX ORDER — OMEPRAZOLE 40 MG/1
40 CAPSULE, DELAYED RELEASE ORAL
Qty: 30 | Refills: 0 | Status: ACTIVE | COMMUNITY
Start: 2018-08-27

## 2019-02-26 ENCOUNTER — APPOINTMENT (OUTPATIENT)
Dept: DERMATOLOGY | Facility: CLINIC | Age: 84
End: 2019-02-26
Payer: MEDICARE

## 2019-02-26 PROCEDURE — 99214 OFFICE O/P EST MOD 30 MIN: CPT

## 2019-02-26 RX ORDER — PREDNISONE 50 MG/1
50 TABLET ORAL
Qty: 30 | Refills: 0 | Status: DISCONTINUED | COMMUNITY
Start: 2019-02-12 | End: 2019-02-26

## 2019-03-19 ENCOUNTER — APPOINTMENT (OUTPATIENT)
Dept: DERMATOLOGY | Facility: CLINIC | Age: 84
End: 2019-03-19
Payer: MEDICARE

## 2019-03-19 PROCEDURE — 99214 OFFICE O/P EST MOD 30 MIN: CPT

## 2019-03-19 RX ORDER — PREDNISONE 10 MG/1
10 TABLET ORAL
Qty: 60 | Refills: 1 | Status: ACTIVE | COMMUNITY
Start: 2019-03-19 | End: 1900-01-01

## 2019-03-19 RX ORDER — RITUXIMAB 10 MG/ML
500 INJECTION, SOLUTION INTRAVENOUS
Qty: 2 | Refills: 0 | Status: ACTIVE | OUTPATIENT
Start: 2019-03-19

## 2019-03-19 RX ORDER — BETAMETHASONE DIPROPIONATE 0.5 MG/G
0.05 OINTMENT, AUGMENTED TOPICAL
Qty: 2 | Refills: 4 | Status: ACTIVE | COMMUNITY
Start: 2019-02-22 | End: 1900-01-01

## 2019-04-30 ENCOUNTER — APPOINTMENT (OUTPATIENT)
Dept: DERMATOLOGY | Facility: CLINIC | Age: 84
End: 2019-04-30
Payer: MEDICARE

## 2019-04-30 PROCEDURE — 99214 OFFICE O/P EST MOD 30 MIN: CPT

## 2019-04-30 RX ORDER — PREDNISONE 10 MG/1
10 TABLET ORAL DAILY
Qty: 30 | Refills: 1 | Status: ACTIVE | COMMUNITY
Start: 2019-04-30 | End: 1900-01-01

## 2019-05-08 ENCOUNTER — APPOINTMENT (OUTPATIENT)
Dept: RHEUMATOLOGY | Facility: CLINIC | Age: 84
End: 2019-05-08
Payer: MEDICARE

## 2019-05-08 PROCEDURE — 96415 CHEMO IV INFUSION ADDL HR: CPT

## 2019-05-08 PROCEDURE — 96413 CHEMO IV INFUSION 1 HR: CPT

## 2019-05-08 PROCEDURE — 96375 TX/PRO/DX INJ NEW DRUG ADDON: CPT

## 2019-05-08 RX ORDER — METHYLPREDNISOLONE 125 MG/2ML
125 INJECTION, POWDER, LYOPHILIZED, FOR SOLUTION INTRAMUSCULAR; INTRAVENOUS
Qty: 1 | Refills: 0 | Status: ACTIVE | OUTPATIENT
Start: 2019-05-08

## 2019-05-28 ENCOUNTER — APPOINTMENT (OUTPATIENT)
Dept: DERMATOLOGY | Facility: CLINIC | Age: 84
End: 2019-05-28
Payer: MEDICARE

## 2019-05-28 PROCEDURE — 99214 OFFICE O/P EST MOD 30 MIN: CPT

## 2019-07-18 ENCOUNTER — APPOINTMENT (OUTPATIENT)
Dept: DERMATOLOGY | Facility: CLINIC | Age: 84
End: 2019-07-18
Payer: MEDICARE

## 2019-07-18 VITALS — SYSTOLIC BLOOD PRESSURE: 120 MMHG | DIASTOLIC BLOOD PRESSURE: 78 MMHG

## 2019-07-18 PROCEDURE — 99214 OFFICE O/P EST MOD 30 MIN: CPT

## 2019-07-18 RX ORDER — PREDNISONE 2.5 MG/1
2.5 TABLET ORAL
Qty: 90 | Refills: 0 | Status: ACTIVE | COMMUNITY
Start: 2019-05-28 | End: 1900-01-01

## 2019-09-24 RX ORDER — PREDNISONE 5 MG/1
5 TABLET ORAL
Qty: 30 | Refills: 0 | Status: ACTIVE | COMMUNITY
Start: 2019-09-24 | End: 1900-01-01

## 2019-10-29 ENCOUNTER — APPOINTMENT (OUTPATIENT)
Dept: DERMATOLOGY | Facility: CLINIC | Age: 84
End: 2019-10-29
Payer: MEDICARE

## 2019-10-29 PROCEDURE — 99214 OFFICE O/P EST MOD 30 MIN: CPT

## 2019-12-17 ENCOUNTER — APPOINTMENT (OUTPATIENT)
Dept: DERMATOLOGY | Facility: CLINIC | Age: 84
End: 2019-12-17
Payer: MEDICARE

## 2019-12-17 DIAGNOSIS — L85.3 XEROSIS CUTIS: ICD-10-CM

## 2019-12-17 PROCEDURE — 99214 OFFICE O/P EST MOD 30 MIN: CPT

## 2019-12-17 RX ORDER — EMOLLIENT BASE
CREAM (GRAM) TOPICAL
Qty: 1 | Refills: 5 | Status: ACTIVE | COMMUNITY
Start: 2019-12-17 | End: 1900-01-01

## 2020-04-23 ENCOUNTER — APPOINTMENT (OUTPATIENT)
Dept: DERMATOLOGY | Facility: CLINIC | Age: 85
End: 2020-04-23

## 2020-05-19 ENCOUNTER — APPOINTMENT (OUTPATIENT)
Dept: DERMATOLOGY | Facility: CLINIC | Age: 85
End: 2020-05-19

## 2021-09-09 NOTE — PATIENT PROFILE ADULT - NSPRONUTRITIONRISK_GEN_A_NUR
[address] [place] [address] Primary Care        NAME: Sanaz Ocasio is a 81 y o  male  : 1949    MRN: [id number]  DATE: 2021  TIME: 11:52 AM    Assessment and Plan   Bronchitis due to COVID-19 virus [U07 1, J40]  1  Bronchitis due to COVID-19 virus  azithromycin (Zithromax) 250 mg tablet         Patient Instructions     Patient Instructions   Discussed OTC cough/cold medications  zithromax only if symptoms worsen as discussed  Call for any concerns/questions  Quarantine for 10 days- patient verbalizes understanding          Chief Complaint     Chief Complaint   Patient presents with    COVID-19         History of Present Illness       Here for a Covid follow up- he is fully vaccinated  Started with symptoms after being at a wedding on Saturday  He states "I'd say I feel like I have a cold"  Denies loss of taste/smell, and N/V/D  Is getting fatigued easily  Reports a mild cough that Mucinex and Robitussin is helping with when he takes it  He is agreeable to calling if his symptoms worsen  Vitals today are stable      Review of Systems   Review of Systems   Constitutional: Positive for activity change and fatigue  Negative for diaphoresis and fever  HENT: Positive for congestion and rhinorrhea  Negative for facial swelling, hearing loss, sinus pressure, sinus pain, sneezing, sore throat and voice change  Eyes: Negative for discharge and visual disturbance  Respiratory: Positive for cough  Negative for choking, chest tightness, shortness of breath, wheezing and stridor  Cardiovascular: Negative for chest pain, palpitations and leg swelling  Gastrointestinal: Negative for abdominal distention, abdominal pain, constipation, diarrhea, nausea and vomiting  Endocrine: Negative for polydipsia, polyphagia and polyuria  Genitourinary: Negative for difficulty urinating, dysuria, frequency and urgency     Musculoskeletal: Negative for arthralgias, back pain, gait problem, joint swelling, myalgias, neck pain and neck stiffness  Skin: Negative for color change, rash and wound  Neurological: Negative for dizziness, syncope, speech difficulty, weakness, light-headedness and headaches  Hematological: Negative for adenopathy  Does not bruise/bleed easily  Psychiatric/Behavioral: Negative for agitation, behavioral problems, confusion, hallucinations, sleep disturbance and suicidal ideas  The patient is not nervous/anxious  Current Medications       Current Outpatient Medications:     azithromycin (Zithromax) 250 mg tablet, Take 2 tablets (500 mg total) by mouth daily for 1 day, THEN 1 tablet (250 mg total) daily for 4 days  , Disp: 6 tablet, Rfl: 0    metFORMIN (GLUCOPHAGE) 500 mg tablet, Take 1 tablet (500 mg total) by mouth daily with breakfast, Disp: 90 tablet, Rfl: 1    simvastatin (ZOCOR) 20 mg tablet, Take 1 tablet (20 mg total) by mouth daily at bedtime, Disp: 90 tablet, Rfl: 3    Current Allergies     Allergies as of 09/09/2021    (No Known Allergies)            The following portions of the patient's history were reviewed and updated as appropriate: allergies, current medications, past family history, past medical history, past social history, past surgical history and problem list      Past Medical History:   Diagnosis Date    Arthritis     Diabetes mellitus ([address] )     Dyslipidemia     GERD (gastroesophageal reflux disease)     Hyperlipidemia     Nodule of left lung 2007    negative for CA    Type 2 diabetes mellitus without complication, without long-term current use of insulin ([address] ) 1/23/2019       Past Surgical History:   Procedure Laterality Date    COLONOSCOPY      ELBOW SURGERY Bilateral     FOOT TENDON SURGERY      heel    TN REVISE MEDIAN N/CARPAL TUNNEL SURG Left 6/30/2021    Procedure: RELEASE CARPAL TUNNEL;  Surgeon: Mateo Cano DO;  Location: [address] [place] [address] MAIN OR;  Service: Orthopedics       Family History   Problem Relation Age of Onset    COPD Father         80 yrs    Heart disease Mother         hx MI   Oxana Russo Diabetes Mother     Cancer Brother          Medications have been verified  Objective   /72 Comment: Jose Rbsmoo  Pulse 96 Comment: Laura  Temp (!) 97 2 °F (36 2 °C) (Tympanic) Comment: Laura Comment (Src): Curbside  Resp 18 Comment: Paulaide  Ht 5' 4 25" (1 632 m) Comment: Curbside from previous visit,  Wt 97 5 kg (215 lb) Comment: Curbside from previous visit  SpO2 99% Comment: Curbside  BMI 36 62 kg/m²        Physical Exam     Physical Exam  Vitals and nursing note reviewed  Constitutional:       General: He is not in acute distress  Appearance: Normal appearance  He is well-developed  He is not diaphoretic  Neck:      Thyroid: No thyromegaly  Trachea: No tracheal deviation  Cardiovascular:      Rate and Rhythm: Normal rate and regular rhythm  Heart sounds: Normal heart sounds  No murmur heard  Pulmonary:      Effort: Pulmonary effort is normal  No respiratory distress  Breath sounds: Normal breath sounds  No wheezing  Musculoskeletal:         General: No tenderness or deformity  Normal range of motion  Cervical back: Normal range of motion and neck supple  Skin:     General: Skin is warm and dry  Neurological:      Mental Status: He is alert and oriented to person, place, and time  Psychiatric:         Mood and Affect: Mood normal          Speech: Speech normal          Behavior: Behavior normal          Thought Content:  Thought content normal          Judgment: Judgment normal  No indicators present

## 2022-12-12 NOTE — PATIENT PROFILE ADULT - NSPROMUTINFOINDIVIDFT_GEN_A_NUR
Helical Rim Advancement Flap Text: The defect edges were debeveled with a #15 blade scalpel.  Given the location of the defect and the proximity to free margins (helical rim) a double helical rim advancement flap was deemed most appropriate.  Using a sterile surgical marker, the appropriate advancement flaps were drawn incorporating the defect and placing the expected incisions between the helical rim and antihelix where possible.  The area thus outlined was incised through and through with a #15 scalpel blade.  With a skin hook and iris scissors, the flaps were gently and sharply undermined and freed up. Controlling pain

## 2023-08-17 ENCOUNTER — APPOINTMENT (OUTPATIENT)
Dept: DERMATOLOGY | Facility: CLINIC | Age: 88
End: 2023-08-17
Payer: MEDICARE

## 2023-08-17 VITALS — HEIGHT: 60 IN | BODY MASS INDEX: 33.38 KG/M2 | WEIGHT: 170 LBS

## 2023-08-17 PROCEDURE — 99204 OFFICE O/P NEW MOD 45 MIN: CPT

## 2023-08-18 NOTE — HISTORY OF PRESENT ILLNESS
[FreeTextEntry1] : Redness on stomach [de-identified] : 87 yo w/ hx of PV presenting for followup of PV (bx proven by outside derm 12/2018, desmoglein 1,3 titer positive), Treated with prednisone, completed taper 11/2019 and azathioprine 100 mg daily since 1/2019 s/p 1g RTX 2/16 and 5/8/19  clinically clear until recently   1) Periumbilical, inflammatory, suprapubic redness. Has been present for 2 months. Denies associated itch. Seen by PCP who prescribed clotrimazole/betamethasone cream. Reports some decrease in redness w/ cream but has persisted.   2) Scalp itch present for 2-3 months. Denies attempting any treatment.   3) Dry/itchy skin. Present for months to years. Diffuse dryness/itch on back, legs, post auricular region.

## 2023-08-18 NOTE — ASSESSMENT
[FreeTextEntry1] : Hx of pemphigus vulgaris, outside biopsy consistent  - previously tx with azathioprine 100 mg daily + pred since 1/2019 - s/p rituximab 2/16 and 5/8/19 - since then, skin has been clear however now with new rash - differential could include intertrigo. Less clinically c/w pemphigus vulgaris  - however, if not improving with below regimen, plan for biopsy - recheck DSG 1 and 3  Rash, inframammary folds, infrapannus, periumbilical - differential includes intertrigo - Start clobetasol 5% solution to umbilicus and scalp 1-2x/day. Side effects of long term use of topical steroids discussed with patient including but not limited to thinning of the skin, atrophy and dyspigmentation. - start nystatin and triamcinolone cream BID to rash under breast and abdomen for next 2-3 weeks. Side effects of long term use of topical steroids discussed with patient including but not limited to thinning of the skin, atrophy and dyspigmentation.  Reassess in ~4 weeks

## 2023-08-18 NOTE — PHYSICAL EXAM
[Alert] : alert [Oriented x 3] : ~L oriented x 3 [Well Nourished] : well nourished [Conjunctiva Non-injected] : conjunctiva non-injected [No Clubbing] : no clubbing [No Bromhidrosis] : no bromhidrosis [No Chromhidrosis] : no chromhidrosis [FreeTextEntry3] : Red alyssa and intra-umbilical plaque Red plaque w/ satellite papules on R inframammary region  Red plaque under abdominal flap Excoriations on R shoulder  Excoriations on scalp, yellow scale on scalp diffuse xerosis on back

## 2023-08-28 RX ORDER — TRIAMCINOLONE ACETONIDE 1 MG/G
0.1 CREAM TOPICAL
Qty: 2 | Refills: 2 | Status: ACTIVE | COMMUNITY
Start: 2023-08-17 | End: 1900-01-01

## 2023-08-28 RX ORDER — NYSTATIN 100000 [USP'U]/G
100000 CREAM TOPICAL
Qty: 2 | Refills: 2 | Status: ACTIVE | COMMUNITY
Start: 2023-08-17 | End: 1900-01-01

## 2023-08-28 RX ORDER — CLOBETASOL PROPIONATE 0.5 MG/ML
0.05 SOLUTION TOPICAL
Qty: 2 | Refills: 2 | Status: ACTIVE | COMMUNITY
Start: 2023-08-17 | End: 1900-01-01

## 2023-08-30 ENCOUNTER — NON-APPOINTMENT (OUTPATIENT)
Age: 88
End: 2023-08-30

## 2023-08-31 ENCOUNTER — NON-APPOINTMENT (OUTPATIENT)
Age: 88
End: 2023-08-31

## 2023-09-01 ENCOUNTER — APPOINTMENT (OUTPATIENT)
Dept: DERMATOLOGY | Facility: CLINIC | Age: 88
End: 2023-09-01
Payer: MEDICARE

## 2023-09-01 VITALS — WEIGHT: 170 LBS | BODY MASS INDEX: 33.38 KG/M2 | HEIGHT: 60 IN

## 2023-09-01 PROCEDURE — 99214 OFFICE O/P EST MOD 30 MIN: CPT

## 2023-09-01 RX ORDER — PREDNISONE 20 MG/1
20 TABLET ORAL
Qty: 30 | Refills: 0 | Status: ACTIVE | COMMUNITY
Start: 2023-09-01 | End: 1900-01-01

## 2023-09-05 RX ORDER — TRIAMCINOLONE ACETONIDE 1 MG/G
0.1 OINTMENT TOPICAL
Qty: 1 | Refills: 1 | Status: ACTIVE | COMMUNITY
Start: 2023-09-05 | End: 1900-01-01

## 2023-09-05 NOTE — ASSESSMENT
[FreeTextEntry1] : Hx of pemphigus vulgaris, outside biopsy consistent - previously tx with azathioprine 100 mg daily + pred since 1/2019 - s/p rituximab 2/16 and 5/8/19 - since then, skin has been clear however now with new rash at 8/2023 visit -scalp, inframammary folds, infrapannus, periumbilical  DSG 1 elevated, DSG 3 nl - favor this being likely disease recurrence   Overall areas have improved with topical steroids (clobetasol solution to umbilicus and scalp) and triamcinolone to rest of rash -- plan to continue TAC 0.1% ointment BID to affected areas. Side effects of long term use of topical steroids discussed with patient including but not limited to thinning of the skin, atrophy and dyspigmentation. At this point, do not feel biopsy will yield very much given clinically improving  Will recheck viral labs and quantiferon gold in anticipation of resuming systemic agent -- cellcept vs rituximab Start pred 40 mg daily for now -- pt on pantoprazole. Start Ca/Vitamin D

## 2023-09-05 NOTE — HISTORY OF PRESENT ILLNESS
[FreeTextEntry1] : followup  [de-identified] : DSG1 very elevated  pt has been using topical steroids to scalp, under breasts with improvement

## 2023-09-12 RX ORDER — METHYLPREDNISOLONE SODIUM SUCCINATE 40 MG/ML
40 INJECTION, POWDER, FOR SOLUTION INTRAMUSCULAR; INTRAVENOUS
Refills: 0 | Status: COMPLETED | OUTPATIENT
Start: 2023-09-12 | End: 1900-01-01

## 2023-09-12 RX ORDER — RITUXIMAB 10 MG/ML
500 INJECTION, SOLUTION INTRAVENOUS
Refills: 0 | Status: COMPLETED | OUTPATIENT
Start: 2023-09-12 | End: 1900-01-01

## 2023-09-12 RX ORDER — CAMPHOR 0.45 %
25 GEL (GRAM) TOPICAL
Refills: 0 | Status: COMPLETED | OUTPATIENT
Start: 2023-09-12 | End: 1900-01-01

## 2023-09-12 RX ORDER — ACETAMINOPHEN 500 MG/1
500 TABLET ORAL
Refills: 0 | Status: COMPLETED | OUTPATIENT
Start: 2023-09-12 | End: 1900-01-01

## 2023-09-15 ENCOUNTER — NON-APPOINTMENT (OUTPATIENT)
Age: 88
End: 2023-09-15

## 2023-09-19 ENCOUNTER — NON-APPOINTMENT (OUTPATIENT)
Age: 88
End: 2023-09-19

## 2023-09-20 RX ORDER — PREDNISONE 10 MG/1
10 TABLET ORAL
Qty: 70 | Refills: 0 | Status: ACTIVE | COMMUNITY
Start: 2023-09-20 | End: 1900-01-01

## 2023-10-11 LAB
ALBUMIN SERPL ELPH-MCNC: 3.5 G/DL
ALP BLD-CCNC: 55 U/L
ALT SERPL-CCNC: 22 U/L
ANION GAP SERPL CALC-SCNC: 12 MMOL/L
AST SERPL-CCNC: 16 U/L
BASOPHILS # BLD AUTO: 0.03 K/UL
BASOPHILS NFR BLD AUTO: 0.2 %
BILIRUB SERPL-MCNC: 0.2 MG/DL
BUN SERPL-MCNC: 30 MG/DL
CALCIUM SERPL-MCNC: 8.7 MG/DL
CHLORIDE SERPL-SCNC: 101 MMOL/L
CO2 SERPL-SCNC: 29 MMOL/L
CREAT SERPL-MCNC: 0.96 MG/DL
DESMOGLEIN 1 AB SER-ACNC: 161 RU/ML
DESMOGLEIN 3 AB SER-ACNC: <2 RU/ML
EGFR: 58 ML/MIN/1.73M2
EOSINOPHIL # BLD AUTO: 0.25 K/UL
EOSINOPHIL NFR BLD AUTO: 1.7 %
GLUCOSE SERPL-MCNC: 115 MG/DL
HBV CORE IGG+IGM SER QL: NONREACTIVE
HBV SURFACE AB SER QL: NONREACTIVE
HBV SURFACE AG SER QL: NONREACTIVE
HCT VFR BLD CALC: 40.3 %
HCV AB SER QL: NONREACTIVE
HCV S/CO RATIO: 0.07 S/CO
HGB BLD-MCNC: 12.6 G/DL
IMM GRANULOCYTES NFR BLD AUTO: 1.1 %
LYMPHOCYTES # BLD AUTO: 2.97 K/UL
LYMPHOCYTES NFR BLD AUTO: 19.7 %
M TB IFN-G BLD-IMP: NEGATIVE
MAN DIFF?: NORMAL
MCHC RBC-ENTMCNC: 30.1 PG
MCHC RBC-ENTMCNC: 31.3 GM/DL
MCV RBC AUTO: 96.4 FL
MONOCYTES # BLD AUTO: 0.99 K/UL
MONOCYTES NFR BLD AUTO: 6.6 %
NEUTROPHILS # BLD AUTO: 10.68 K/UL
NEUTROPHILS NFR BLD AUTO: 70.7 %
PLATELET # BLD AUTO: 297 K/UL
POTASSIUM SERPL-SCNC: 3.7 MMOL/L
PROT SERPL-MCNC: 5.9 G/DL
QUANTIFERON TB PLUS MITOGEN MINUS NIL: 7.98 IU/ML
QUANTIFERON TB PLUS NIL: 0.01 IU/ML
QUANTIFERON TB PLUS TB1 MINUS NIL: 0 IU/ML
QUANTIFERON TB PLUS TB2 MINUS NIL: 0 IU/ML
RBC # BLD: 4.18 M/UL
RBC # FLD: 15.1 %
SODIUM SERPL-SCNC: 142 MMOL/L
WBC # FLD AUTO: 15.09 K/UL

## 2023-10-12 ENCOUNTER — APPOINTMENT (OUTPATIENT)
Dept: DERMATOLOGY | Facility: CLINIC | Age: 88
End: 2023-10-12
Payer: MEDICARE

## 2023-10-12 DIAGNOSIS — Z79.899 OTHER LONG TERM (CURRENT) DRUG THERAPY: ICD-10-CM

## 2023-10-12 PROCEDURE — 99214 OFFICE O/P EST MOD 30 MIN: CPT

## 2023-10-12 RX ORDER — PREDNISONE 5 MG/1
5 TABLET ORAL
Qty: 30 | Refills: 0 | Status: ACTIVE | COMMUNITY
Start: 2023-10-12 | End: 1900-01-01

## 2023-10-12 RX ORDER — PREDNISONE 10 MG/1
10 TABLET ORAL
Qty: 30 | Refills: 0 | Status: ACTIVE | COMMUNITY
Start: 2023-09-12 | End: 1900-01-01

## 2023-10-30 ENCOUNTER — APPOINTMENT (OUTPATIENT)
Dept: RHEUMATOLOGY | Facility: CLINIC | Age: 88
End: 2023-10-30
Payer: MEDICARE

## 2023-10-30 VITALS
HEART RATE: 70 BPM | OXYGEN SATURATION: 96 % | DIASTOLIC BLOOD PRESSURE: 67 MMHG | RESPIRATION RATE: 16 BRPM | SYSTOLIC BLOOD PRESSURE: 152 MMHG | TEMPERATURE: 98.4 F

## 2023-10-30 VITALS
HEART RATE: 63 BPM | RESPIRATION RATE: 16 BRPM | TEMPERATURE: 97.7 F | OXYGEN SATURATION: 97 % | DIASTOLIC BLOOD PRESSURE: 58 MMHG | SYSTOLIC BLOOD PRESSURE: 95 MMHG

## 2023-10-30 VITALS
OXYGEN SATURATION: 97 % | RESPIRATION RATE: 16 BRPM | DIASTOLIC BLOOD PRESSURE: 69 MMHG | SYSTOLIC BLOOD PRESSURE: 138 MMHG | HEART RATE: 61 BPM | TEMPERATURE: 98.1 F

## 2023-10-30 VITALS
DIASTOLIC BLOOD PRESSURE: 62 MMHG | OXYGEN SATURATION: 94 % | SYSTOLIC BLOOD PRESSURE: 102 MMHG | HEART RATE: 68 BPM | RESPIRATION RATE: 16 BRPM

## 2023-10-30 VITALS
OXYGEN SATURATION: 97 % | SYSTOLIC BLOOD PRESSURE: 108 MMHG | DIASTOLIC BLOOD PRESSURE: 75 MMHG | HEART RATE: 61 BPM | RESPIRATION RATE: 16 BRPM | TEMPERATURE: 98.2 F

## 2023-10-30 VITALS
SYSTOLIC BLOOD PRESSURE: 95 MMHG | TEMPERATURE: 97.3 F | RESPIRATION RATE: 16 BRPM | HEART RATE: 66 BPM | OXYGEN SATURATION: 94 % | DIASTOLIC BLOOD PRESSURE: 60 MMHG

## 2023-10-30 PROCEDURE — 96374 THER/PROPH/DIAG INJ IV PUSH: CPT | Mod: 59

## 2023-10-30 PROCEDURE — 96413 CHEMO IV INFUSION 1 HR: CPT

## 2023-10-30 PROCEDURE — 96415 CHEMO IV INFUSION ADDL HR: CPT

## 2023-10-30 RX ORDER — ACETAMINOPHEN 500 MG/1
500 TABLET ORAL
Qty: 0 | Refills: 0 | Status: COMPLETED | OUTPATIENT
Start: 2023-09-12

## 2023-10-30 RX ORDER — CAMPHOR 0.45 %
25 GEL (GRAM) TOPICAL
Qty: 0 | Refills: 0 | Status: COMPLETED | OUTPATIENT
Start: 2023-09-12

## 2023-10-30 RX ORDER — RITUXIMAB 10 MG/ML
500 INJECTION, SOLUTION INTRAVENOUS
Qty: 0 | Refills: 0 | Status: COMPLETED | OUTPATIENT
Start: 2023-09-12

## 2023-10-30 RX ORDER — CAMPHOR 0.45 %
25 GEL (GRAM) TOPICAL
Qty: 1 | Refills: 0 | Status: DISCONTINUED | OUTPATIENT
Start: 2019-03-19 | End: 2023-10-30

## 2023-10-30 RX ORDER — METHYLPREDNISOLONE SODIUM SUCCINATE 40 MG/ML
40 INJECTION, POWDER, FOR SOLUTION INTRAMUSCULAR; INTRAVENOUS
Qty: 0 | Refills: 0 | Status: COMPLETED | OUTPATIENT
Start: 2023-09-12

## 2023-11-13 ENCOUNTER — APPOINTMENT (OUTPATIENT)
Dept: RHEUMATOLOGY | Facility: CLINIC | Age: 88
End: 2023-11-13
Payer: MEDICARE

## 2023-11-13 VITALS
SYSTOLIC BLOOD PRESSURE: 138 MMHG | TEMPERATURE: 98.2 F | OXYGEN SATURATION: 93 % | DIASTOLIC BLOOD PRESSURE: 75 MMHG | HEART RATE: 55 BPM

## 2023-11-13 VITALS
OXYGEN SATURATION: 95 % | SYSTOLIC BLOOD PRESSURE: 147 MMHG | HEART RATE: 62 BPM | TEMPERATURE: 97.5 F | DIASTOLIC BLOOD PRESSURE: 78 MMHG | RESPIRATION RATE: 16 BRPM

## 2023-11-13 VITALS
DIASTOLIC BLOOD PRESSURE: 67 MMHG | HEART RATE: 68 BPM | SYSTOLIC BLOOD PRESSURE: 104 MMHG | TEMPERATURE: 97.3 F | OXYGEN SATURATION: 93 %

## 2023-11-13 PROCEDURE — 96415 CHEMO IV INFUSION ADDL HR: CPT

## 2023-11-13 PROCEDURE — 96413 CHEMO IV INFUSION 1 HR: CPT

## 2023-11-13 PROCEDURE — 96374 THER/PROPH/DIAG INJ IV PUSH: CPT | Mod: 59

## 2023-11-13 RX ORDER — METHYLPREDNISOLONE 125 MG/2ML
125 INJECTION, POWDER, LYOPHILIZED, FOR SOLUTION INTRAMUSCULAR; INTRAVENOUS
Qty: 0 | Refills: 0 | Status: COMPLETED | OUTPATIENT
Start: 2019-02-14

## 2023-11-13 RX ORDER — RITUXIMAB 10 MG/ML
500 INJECTION, SOLUTION INTRAVENOUS
Qty: 0 | Refills: 0 | Status: COMPLETED | OUTPATIENT
Start: 2019-02-14

## 2023-11-13 RX ORDER — CAMPHOR 0.45 %
25 GEL (GRAM) TOPICAL
Qty: 0 | Refills: 0 | Status: COMPLETED | OUTPATIENT
Start: 2023-09-20

## 2023-11-13 RX ORDER — ACETAMINOPHEN 500 MG/1
500 TABLET ORAL
Qty: 0 | Refills: 0 | Status: COMPLETED | OUTPATIENT
Start: 2023-09-20

## 2023-11-28 ENCOUNTER — APPOINTMENT (OUTPATIENT)
Dept: DERMATOLOGY | Facility: CLINIC | Age: 88
End: 2023-11-28
Payer: MEDICARE

## 2023-11-28 DIAGNOSIS — R21 RASH AND OTHER NONSPECIFIC SKIN ERUPTION: ICD-10-CM

## 2023-11-28 DIAGNOSIS — L10.0 PEMPHIGUS VULGARIS: ICD-10-CM

## 2023-11-28 PROCEDURE — 99214 OFFICE O/P EST MOD 30 MIN: CPT

## 2023-11-28 RX ORDER — HYDROCORTISONE 25 MG/G
2.5 CREAM TOPICAL
Qty: 1 | Refills: 1 | Status: ACTIVE | COMMUNITY
Start: 2023-11-28 | End: 1900-01-01

## 2023-11-28 RX ORDER — PREDNISONE 1 MG/1
1 TABLET ORAL
Qty: 140 | Refills: 0 | Status: ACTIVE | COMMUNITY
Start: 2023-11-28 | End: 1900-01-01

## 2024-02-13 RX ORDER — AMLODIPINE BESYLATE 2.5 MG/1
1 TABLET ORAL
Qty: 0 | Refills: 0 | DISCHARGE

## 2024-02-13 RX ORDER — DOXAZOSIN MESYLATE 4 MG
1 TABLET ORAL
Qty: 0 | Refills: 0 | DISCHARGE

## 2024-02-13 RX ORDER — METOPROLOL TARTRATE 50 MG
1 TABLET ORAL
Qty: 0 | Refills: 0 | DISCHARGE

## 2024-02-13 RX ORDER — TRIAMTERENE/HYDROCHLOROTHIAZID 75 MG-50MG
1 TABLET ORAL
Qty: 0 | Refills: 0 | DISCHARGE

## 2024-02-13 RX ORDER — LEVOTHYROXINE SODIUM 125 MCG
1 TABLET ORAL
Qty: 0 | Refills: 0 | DISCHARGE

## 2024-02-13 RX ORDER — ESCITALOPRAM OXALATE 10 MG/1
1 TABLET, FILM COATED ORAL
Qty: 0 | Refills: 0 | DISCHARGE

## 2024-02-13 RX ORDER — AZATHIOPRINE 100 MG/1
2 TABLET ORAL
Qty: 0 | Refills: 0 | DISCHARGE

## 2024-02-13 RX ORDER — ASPIRIN/CALCIUM CARB/MAGNESIUM 324 MG
1 TABLET ORAL
Qty: 0 | Refills: 0 | DISCHARGE

## 2024-02-13 RX ORDER — ZOLPIDEM TARTRATE 10 MG/1
1 TABLET ORAL
Qty: 0 | Refills: 0 | DISCHARGE

## 2024-02-13 RX ORDER — OMEGA-3 ACID ETHYL ESTERS 1 G
2 CAPSULE ORAL
Qty: 0 | Refills: 0 | DISCHARGE

## 2024-02-13 RX ORDER — PANTOPRAZOLE SODIUM 20 MG/1
1 TABLET, DELAYED RELEASE ORAL
Qty: 0 | Refills: 0 | DISCHARGE

## 2024-02-13 RX ORDER — ATORVASTATIN CALCIUM 80 MG/1
1 TABLET, FILM COATED ORAL
Qty: 0 | Refills: 0 | DISCHARGE